# Patient Record
Sex: FEMALE | Race: BLACK OR AFRICAN AMERICAN | NOT HISPANIC OR LATINO | Employment: FULL TIME | URBAN - METROPOLITAN AREA
[De-identification: names, ages, dates, MRNs, and addresses within clinical notes are randomized per-mention and may not be internally consistent; named-entity substitution may affect disease eponyms.]

---

## 2023-03-22 LAB
CLUE CELLS: ABNORMAL
NUGENT SCORE: 9
YEAST: ABNORMAL

## 2023-03-23 LAB
CHLAMYDIA TRACH., AMPLIFIED: NEGATIVE
N. GONORRHEA, AMPLIFIED: NEGATIVE
TRICHOMONAS VAGINALIS: NEGATIVE

## 2023-04-24 ENCOUNTER — APPOINTMENT (OUTPATIENT)
Dept: PRIMARY CARE | Facility: CLINIC | Age: 28
End: 2023-04-24
Payer: COMMERCIAL

## 2023-07-07 ENCOUNTER — TELEPHONE (OUTPATIENT)
Dept: PRIMARY CARE | Facility: CLINIC | Age: 28
End: 2023-07-07
Payer: COMMERCIAL

## 2023-07-10 DIAGNOSIS — Z11.3 ROUTINE SCREENING FOR STI (SEXUALLY TRANSMITTED INFECTION): Primary | ICD-10-CM

## 2023-07-17 ENCOUNTER — LAB (OUTPATIENT)
Dept: LAB | Facility: LAB | Age: 28
End: 2023-07-17
Payer: COMMERCIAL

## 2023-07-17 ENCOUNTER — OFFICE VISIT (OUTPATIENT)
Dept: PRIMARY CARE | Facility: CLINIC | Age: 28
End: 2023-07-17
Payer: COMMERCIAL

## 2023-07-17 VITALS
SYSTOLIC BLOOD PRESSURE: 132 MMHG | HEART RATE: 106 BPM | TEMPERATURE: 97.8 F | DIASTOLIC BLOOD PRESSURE: 92 MMHG | WEIGHT: 93.2 LBS | BODY MASS INDEX: 20.9 KG/M2 | OXYGEN SATURATION: 100 %

## 2023-07-17 DIAGNOSIS — R35.0 FREQUENCY OF URINATION: ICD-10-CM

## 2023-07-17 DIAGNOSIS — Z71.6 ENCOUNTER FOR SMOKING CESSATION COUNSELING: ICD-10-CM

## 2023-07-17 DIAGNOSIS — F32.1 CURRENT MODERATE EPISODE OF MAJOR DEPRESSIVE DISORDER, UNSPECIFIED WHETHER RECURRENT (MULTI): ICD-10-CM

## 2023-07-17 DIAGNOSIS — Z02.89 ENCOUNTER FOR PHYSICAL EXAMINATION RELATED TO EMPLOYMENT: ICD-10-CM

## 2023-07-17 DIAGNOSIS — Z11.3 ROUTINE SCREENING FOR STI (SEXUALLY TRANSMITTED INFECTION): ICD-10-CM

## 2023-07-17 DIAGNOSIS — H44.519 ABSOLUTE GLAUCOMA, UNSPECIFIED LATERALITY: ICD-10-CM

## 2023-07-17 DIAGNOSIS — N30.00 ACUTE CYSTITIS WITHOUT HEMATURIA: ICD-10-CM

## 2023-07-17 DIAGNOSIS — Z00.00 HEALTHCARE MAINTENANCE: Primary | ICD-10-CM

## 2023-07-17 LAB
HIV 1/ 2 AG/AB SCREEN: NONREACTIVE
POC APPEARANCE, URINE: CLEAR
POC BILIRUBIN, URINE: ABNORMAL
POC BLOOD, URINE: NEGATIVE
POC COLOR, URINE: ABNORMAL
POC GLUCOSE, URINE: NEGATIVE MG/DL
POC KETONES, URINE: ABNORMAL MG/DL
POC LEUKOCYTES, URINE: ABNORMAL
POC NITRITE,URINE: POSITIVE
POC PH, URINE: 6 PH
POC PROTEIN, URINE: ABNORMAL MG/DL
POC SPECIFIC GRAVITY, URINE: >=1.03
POC UROBILINOGEN, URINE: 1 EU/DL
SYPHILIS TOTAL AB: NONREACTIVE

## 2023-07-17 PROCEDURE — 87491 CHLMYD TRACH DNA AMP PROBE: CPT

## 2023-07-17 PROCEDURE — 86481 TB AG RESPONSE T-CELL SUSP: CPT

## 2023-07-17 PROCEDURE — 81003 URINALYSIS AUTO W/O SCOPE: CPT

## 2023-07-17 PROCEDURE — 87389 HIV-1 AG W/HIV-1&-2 AB AG IA: CPT

## 2023-07-17 PROCEDURE — 87591 N.GONORRHOEAE DNA AMP PROB: CPT

## 2023-07-17 PROCEDURE — 86780 TREPONEMA PALLIDUM: CPT

## 2023-07-17 PROCEDURE — 36415 COLL VENOUS BLD VENIPUNCTURE: CPT

## 2023-07-17 PROCEDURE — 87661 TRICHOMONAS VAGINALIS AMPLIF: CPT

## 2023-07-17 PROCEDURE — 99395 PREV VISIT EST AGE 18-39: CPT

## 2023-07-17 PROCEDURE — 99406 BEHAV CHNG SMOKING 3-10 MIN: CPT

## 2023-07-17 RX ORDER — BRIMONIDINE TARTRATE AND TIMOLOL MALEATE 2; 5 MG/ML; MG/ML
1 SOLUTION OPHTHALMIC 2 TIMES DAILY
COMMUNITY
Start: 2019-06-07 | End: 2023-07-17 | Stop reason: SDUPTHER

## 2023-07-17 RX ORDER — BRIMONIDINE TARTRATE AND TIMOLOL MALEATE 2; 5 MG/ML; MG/ML
1 SOLUTION OPHTHALMIC 2 TIMES DAILY
Qty: 5 ML | Refills: 3 | Status: SHIPPED | OUTPATIENT
Start: 2023-07-17 | End: 2023-07-17

## 2023-07-17 RX ORDER — IBUPROFEN 200 MG
1 TABLET ORAL EVERY 24 HOURS
Qty: 30 PATCH | Refills: 0 | Status: SHIPPED | OUTPATIENT
Start: 2023-07-17 | End: 2023-07-17

## 2023-07-17 RX ORDER — DORZOLAMIDE HCL 20 MG/ML
1 SOLUTION/ DROPS OPHTHALMIC 2 TIMES DAILY
COMMUNITY
Start: 2022-10-07 | End: 2023-12-07 | Stop reason: WASHOUT

## 2023-07-17 RX ORDER — SULFAMETHOXAZOLE AND TRIMETHOPRIM 800; 160 MG/1; MG/1
1 TABLET ORAL 2 TIMES DAILY
Qty: 6 TABLET | Refills: 0 | Status: SHIPPED | OUTPATIENT
Start: 2023-07-17 | End: 2023-07-17

## 2023-07-17 ASSESSMENT — ENCOUNTER SYMPTOMS
SHORTNESS OF BREATH: 0
ABDOMINAL PAIN: 0
ABDOMINAL DISTENTION: 0
CHEST TIGHTNESS: 0

## 2023-07-17 ASSESSMENT — PAIN SCALES - GENERAL: PAINLEVEL: 0-NO PAIN

## 2023-07-17 NOTE — PROGRESS NOTES
Subjective   Patient ID:   Clinton Camp is a 28 y.o. female who presents for Annual Exam.  HPI  # C/F UTI  - Reports that her urine doesn't smell normal  - Reports she is peeing more frequently than usual  - Denies any dysuria or discharge    #HM  - Diet: feels like she doesn't eat enough, eats only 1 meal a day, has variable appetite  - Exercise: walks a lot    - Social Hx: Reports she drinks a bottle of liquor every other day but is unable to quantify the amount. She smokes 5 black and milds per day and has been smoking for 5 years. She has tried quitting in the past but was not successful. She reports smoking marijuana but denies using any other drugs. She lives in a house with her mom and brother and reports she feels safe    - Sexual Hx: Reports she has been active with 4 male partners in the past 6 months and only uses protection sometimes    PHQ-2 score of 4  PHQ-9 score of 17 - moderate to severe depression with thought of harming herself (+3)  - Denies having any plan  - Reported no access to fire arms at home  - Has never hurt herself in the past and reported that she will seek help before she acts on it  - She reported she is depressed due to stressors in her life such as losing her job and house  - Denies any family history of suicide  - Reports it is difficult to speak with her friends about it  - Interested in speaking with a     Food Security: answered yes to food security questions    #Pre-employment labs  - Needs TB testing for employment    #Med refill  - Requested refills on Combigan eye drops    Review of Systems   Respiratory:  Negative for chest tightness and shortness of breath.    Cardiovascular:  Negative for chest pain.   Gastrointestinal:  Negative for abdominal distention and abdominal pain.       Objective   BP (!) 132/92 (BP Location: Right arm, Patient Position: Sitting, BP Cuff Size: Adult)   Pulse 106   Temp 36.6 °C (97.8 °F) (Temporal)   Wt (!) 42.3 kg (93 lb 3.2 oz)    SpO2 100%   BMI 20.90 kg/m²    Physical Exam  Constitutional:       General: She is not in acute distress.     Appearance: Normal appearance.   Eyes:      Extraocular Movements: Extraocular movements intact.   Cardiovascular:      Rate and Rhythm: Normal rate and regular rhythm.      Heart sounds: Normal heart sounds. No murmur heard.  Pulmonary:      Effort: Pulmonary effort is normal.      Breath sounds: Normal breath sounds.   Abdominal:      General: There is no distension.      Palpations: Abdomen is soft.      Tenderness: There is no abdominal tenderness.   Musculoskeletal:         General: Normal range of motion.   Skin:     General: Skin is warm and dry.   Neurological:      General: No focal deficit present.      Mental Status: She is alert.   Psychiatric:         Mood and Affect: Mood normal.         Behavior: Behavior is withdrawn.         Assessment/Plan     Problem List Items Addressed This Visit    None  Clinton Camp is a 28 year old female who presents to the clinic for a  visit.     #HM  - Ordered STI screen; chlamydia, gonorrhea, syphillis, HIV, & trichomonas  - Patient had recent blood work done at [facility] which was largely unremarkable  - Ordered TB spot test for employment  - Discussed cutting down on alcohol intake and given a alcohol reduction pamphlet    #Depression  - PHQ-9 score of 17 indicating moderate - severe depression  - Referred to psychiatry  - Given handout with information on various mental health councilors  - Patient declined medication at this time and would like to speak with a  first    #UTI  - Ordered IO UA which was positive for Nitrates and LE  - Ordered Bactrim DS BID for 3 days for treatment    #Smoking cessation  - Patient is motivated to cut down on smoking  - Ordered Nicotine patch to help with cravings  - Given smoking cessation hotline pamphlet    #Food security  - Referred to [employer]       #Med refill  - Refilled Combigan eye drops    RTC in 4 weeks  for follow up.    The patient was discussed with Dr. Pichardo.    Demetrio Parish MD  Family Medicine   PGY-2

## 2023-07-18 NOTE — PROGRESS NOTES
"I discussed suicidal ideation personally with the patient. She endorses passive suicidal ideation. In her words, she states: \"Some days I wish I just wouldn't wake up. I've got so much going on. I just lost my house and I'm getting a new place to live.\" She has no access to firearms or other deadly means. She has no plan. She has never been hospitalized for mental health concerns. She has Bahai views againsty suicide. She has a grandmother and an aunt she can confide in, both of whom get some kind of counseling themselves. She offers that if she was ever to feel overwhelmed by her symptoms or concerned about acting on thoughts, she would present to the emergency room.    I saw and evaluated the patient. I personally obtained the key and critical portions of the history and physical exam or was physically present for key and critical portions performed by the resident/fellow. I reviewed the resident/fellow's documentation and discussed the patient with the resident/fellow. I agree with the resident/fellow's medical decision making as documented in the note.    Jabari Pichardo MD      "

## 2023-07-20 LAB
NIL(NEG) CONTROL SPOT COUNT: NORMAL
PANEL A SPOT COUNT: 0
PANEL B SPOT COUNT: 0
POS CONTROL SPOT COUNT: NORMAL
T-SPOT. TB INTERPRETATION: NEGATIVE

## 2023-07-20 NOTE — TELEPHONE ENCOUNTER
Result Communication    Resulted Orders   Syphilis Screen with Reflex   Result Value Ref Range    Syphilis Total Ab NONREACTIVE NONREACTIVE      Comment:      No serologic evidence of syphilis infection.  If recent exposure is suspected, repeat syphilis testing  is recommended in 2 to 4 weeks.   HIV 1/2 Antigen/Antibody Screen with Reflex to Confirmation   Result Value Ref Range    HIV 1 and 2 Screen NONREACTIVE NONREACTIVE      Comment:       HIV Ag/Ab screen is performed using the Siemens Lucid ColloidsllRUNform   HIV Ag/Ab Combo assay which detects the presence of HIV    p24 antigen as well as antibodies to HIV-1   (Group M and O) and HIV-2.  .  No laboratory evidence of HIV infection. If acute HIV infection is   suspected, consider testing for HIV RNA by PCR (viral load).       12:43 PM      Results were successfully communicated with the patient and they acknowledged their understanding.

## 2023-10-10 ENCOUNTER — PHARMACY VISIT (OUTPATIENT)
Dept: PHARMACY | Facility: CLINIC | Age: 28
End: 2023-10-10
Payer: MEDICAID

## 2023-10-10 PROCEDURE — RXMED WILLOW AMBULATORY MEDICATION CHARGE

## 2023-10-18 ENCOUNTER — HOSPITAL ENCOUNTER (EMERGENCY)
Facility: HOSPITAL | Age: 28
Discharge: OTHER INSTITUTION | End: 2023-10-18
Payer: COMMERCIAL

## 2023-10-18 VITALS
RESPIRATION RATE: 16 BRPM | DIASTOLIC BLOOD PRESSURE: 80 MMHG | HEART RATE: 88 BPM | SYSTOLIC BLOOD PRESSURE: 133 MMHG | OXYGEN SATURATION: 98 % | TEMPERATURE: 97.2 F

## 2023-10-18 DIAGNOSIS — R07.81 RIB PAIN ON LEFT SIDE: Primary | ICD-10-CM

## 2023-10-18 PROCEDURE — 99283 EMERGENCY DEPT VISIT LOW MDM: CPT | Performed by: NURSE PRACTITIONER

## 2023-10-18 PROCEDURE — 99281 EMR DPT VST MAYX REQ PHY/QHP: CPT

## 2023-10-18 ASSESSMENT — COLUMBIA-SUICIDE SEVERITY RATING SCALE - C-SSRS
6. HAVE YOU EVER DONE ANYTHING, STARTED TO DO ANYTHING, OR PREPARED TO DO ANYTHING TO END YOUR LIFE?: NO
2. HAVE YOU ACTUALLY HAD ANY THOUGHTS OF KILLING YOURSELF?: NO
1. IN THE PAST MONTH, HAVE YOU WISHED YOU WERE DEAD OR WISHED YOU COULD GO TO SLEEP AND NOT WAKE UP?: NO
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
## 2023-10-25 ENCOUNTER — PHARMACY VISIT (OUTPATIENT)
Dept: PHARMACY | Facility: CLINIC | Age: 28
End: 2023-10-25
Payer: MEDICAID

## 2023-10-25 PROCEDURE — RXMED WILLOW AMBULATORY MEDICATION CHARGE

## 2023-10-29 ENCOUNTER — PHARMACY VISIT (OUTPATIENT)
Dept: PHARMACY | Facility: CLINIC | Age: 28
End: 2023-10-29
Payer: MEDICAID

## 2023-10-29 PROCEDURE — RXMED WILLOW AMBULATORY MEDICATION CHARGE

## 2023-10-30 ENCOUNTER — PHARMACY VISIT (OUTPATIENT)
Dept: PHARMACY | Facility: CLINIC | Age: 28
End: 2023-10-30

## 2023-11-15 ENCOUNTER — PHARMACY VISIT (OUTPATIENT)
Dept: PHARMACY | Facility: CLINIC | Age: 28
End: 2023-11-15
Payer: MEDICAID

## 2023-11-15 DIAGNOSIS — H40.1133 PRIMARY OPEN-ANGLE GLAUCOMA, BILATERAL, SEVERE STAGE: Primary | ICD-10-CM

## 2023-11-15 PROCEDURE — RXMED WILLOW AMBULATORY MEDICATION CHARGE

## 2023-11-15 RX ORDER — DORZOLAMIDE HCL 20 MG/ML
1 SOLUTION/ DROPS OPHTHALMIC 2 TIMES DAILY
Qty: 30 ML | Refills: 6 | Status: SHIPPED | OUTPATIENT
Start: 2023-11-15 | End: 2023-12-07 | Stop reason: WASHOUT

## 2023-11-16 ENCOUNTER — OFFICE VISIT (OUTPATIENT)
Dept: OPHTHALMOLOGY | Facility: CLINIC | Age: 28
End: 2023-11-16
Payer: COMMERCIAL

## 2023-11-16 DIAGNOSIS — H40.9 GLAUCOMA OF BOTH EYES, UNSPECIFIED GLAUCOMA TYPE: Primary | ICD-10-CM

## 2023-11-16 PROCEDURE — 99214 OFFICE O/P EST MOD 30 MIN: CPT | Performed by: OPHTHALMOLOGY

## 2023-11-16 PROCEDURE — 92083 EXTENDED VISUAL FIELD XM: CPT | Performed by: OPHTHALMOLOGY

## 2023-11-16 ASSESSMENT — TONOMETRY
OD_IOP_MMHG: 22
OS_IOP_MMHG: 22
IOP_METHOD: GOLDMANN APPLANATION

## 2023-11-16 ASSESSMENT — VISUAL ACUITY
OD_SC: LP
METHOD: SNELLEN - LINEAR
OS_SC: 20/60

## 2023-11-16 ASSESSMENT — SLIT LAMP EXAM - LIDS
COMMENTS: NORMAL
COMMENTS: NORMAL

## 2023-11-16 ASSESSMENT — EXTERNAL EXAM - LEFT EYE: OS_EXAM: NORMAL

## 2023-11-16 ASSESSMENT — EXTERNAL EXAM - RIGHT EYE: OD_EXAM: NORMAL

## 2023-11-16 NOTE — PROGRESS NOTES
Juvenile glaucoma OU, severe OU : CCT: 584/596. s/p TUBE shunts OU with Dr. Rain (appear to be baerveldts)     patient with long standing hx of non-compliance     of note vision checked by MD today, she maintains LP vision in right eye    [facility] Visual Field - OU - Both Eyes          Left Eye  Threshold was 24-2.     Notes  Severe defect Os, stable from previous             IOP is up OU patient remains non-compliant with drops     re-discussion ~20 minutes with patient re pathophys of glaucoma and permanent blindness potential     discussed alternatives of blindness or more surgery if she doesn`t take drops     she wishes to resume taking drops, instructions for improved compliances given    Continue combigan BID OU     resume latan qhs OU     resume dorzolamide BID ou     rtc 3-4 months for DFE

## 2023-11-27 ENCOUNTER — APPOINTMENT (OUTPATIENT)
Dept: PRIMARY CARE | Facility: CLINIC | Age: 28
End: 2023-11-27
Payer: COMMERCIAL

## 2023-11-30 ENCOUNTER — PHARMACY VISIT (OUTPATIENT)
Dept: PHARMACY | Facility: CLINIC | Age: 28
End: 2023-11-30
Payer: MEDICAID

## 2023-11-30 PROCEDURE — RXMED WILLOW AMBULATORY MEDICATION CHARGE

## 2023-12-07 DIAGNOSIS — H40.9 GLAUCOMA OF BOTH EYES, UNSPECIFIED GLAUCOMA TYPE: Primary | ICD-10-CM

## 2023-12-07 RX ORDER — DORZOLAMIDE HCL 20 MG/ML
1 SOLUTION/ DROPS OPHTHALMIC 2 TIMES DAILY
Qty: 30 ML | Refills: 6 | Status: SHIPPED | OUTPATIENT
Start: 2023-12-07 | End: 2024-12-06

## 2023-12-07 RX ORDER — BRIMONIDINE TARTRATE AND TIMOLOL MALEATE 2; 5 MG/ML; MG/ML
1 SOLUTION OPHTHALMIC 2 TIMES DAILY
Qty: 30 ML | Refills: 6 | Status: SHIPPED | OUTPATIENT
Start: 2023-12-07 | End: 2024-12-06

## 2023-12-07 RX ORDER — LATANOPROST 50 UG/ML
1 SOLUTION/ DROPS OPHTHALMIC
Qty: 7.5 ML | Refills: 3 | Status: SHIPPED | OUTPATIENT
Start: 2023-12-07 | End: 2024-12-06

## 2023-12-08 ENCOUNTER — PHARMACY VISIT (OUTPATIENT)
Dept: PHARMACY | Facility: CLINIC | Age: 28
End: 2023-12-08
Payer: MEDICAID

## 2023-12-08 PROCEDURE — RXMED WILLOW AMBULATORY MEDICATION CHARGE

## 2023-12-11 ENCOUNTER — APPOINTMENT (OUTPATIENT)
Dept: LAB | Facility: LAB | Age: 28
End: 2023-12-11
Payer: COMMERCIAL

## 2023-12-11 ENCOUNTER — OFFICE VISIT (OUTPATIENT)
Dept: OBSTETRICS AND GYNECOLOGY | Facility: CLINIC | Age: 28
End: 2023-12-11
Payer: COMMERCIAL

## 2023-12-11 VITALS
SYSTOLIC BLOOD PRESSURE: 104 MMHG | HEIGHT: 56 IN | BODY MASS INDEX: 22.72 KG/M2 | WEIGHT: 101 LBS | DIASTOLIC BLOOD PRESSURE: 60 MMHG

## 2023-12-11 DIAGNOSIS — Z01.419 WELL WOMAN EXAM WITH ROUTINE GYNECOLOGICAL EXAM: ICD-10-CM

## 2023-12-11 DIAGNOSIS — Z30.09 BIRTH CONTROL COUNSELING: ICD-10-CM

## 2023-12-11 DIAGNOSIS — Z11.3 SCREEN FOR STD (SEXUALLY TRANSMITTED DISEASE): Primary | ICD-10-CM

## 2023-12-11 PROCEDURE — 99385 PREV VISIT NEW AGE 18-39: CPT | Performed by: OBSTETRICS & GYNECOLOGY

## 2023-12-11 PROCEDURE — 87800 DETECT AGNT MULT DNA DIREC: CPT

## 2023-12-11 PROCEDURE — 88175 CYTOPATH C/V AUTO FLUID REDO: CPT

## 2023-12-11 PROCEDURE — 87661 TRICHOMONAS VAGINALIS AMPLIF: CPT

## 2023-12-11 RX ORDER — MEDROXYPROGESTERONE ACETATE 150 MG/ML
150 INJECTION, SUSPENSION INTRAMUSCULAR
Qty: 1 ML | Refills: 3 | Status: SHIPPED | OUTPATIENT
Start: 2023-12-11

## 2023-12-11 RX ORDER — SOFT LENS RINSE,STORE SOLUTION
SOLUTION, NON-ORAL MISCELLANEOUS
COMMUNITY
Start: 2017-08-31

## 2023-12-11 RX ORDER — TIMOLOL MALEATE 5 MG/ML
1 SOLUTION/ DROPS OPHTHALMIC 2 TIMES DAILY
COMMUNITY
Start: 2018-03-01

## 2023-12-11 NOTE — PROGRESS NOTES
"History Of Present Illness  Routine Gyn Exam  Clinton Camp here for routine WWE. Current complaints: desires STD screening.  Sexually active-- three current partners. She has a prior h/o GC and CT and trichomonas.    She reports vaginal discharge that is \"gushing out\" with a foul odor. White in color.     Gynecologic History  No LMP recorded (lmp unknown).  Contraception:  Desires DepoProvera.  She reports her LMP was 2-3 months ago.   Last Depo shot was about a year ago. Last intercourse was one week ago (used a condom).  Last Pap: unsure. Results were: unsure  Last mammogram: NA. Results were: NA  Last Colonoscopy:  NA. Results were: NA  Last DEXA: NA. Results were: NA  Lipid/DM: NA    Obstetric History  OB History    Para Term  AB Living   0 0 0 0 0 0   SAB IAB Ectopic Multiple Live Births   0 0 0 0 0        Past Medical History  She has a past medical history of Acute vaginitis (2019), Encounter for immunization (11/10/2014), Encounter for pre-employment examination (2017), Encounter for surveillance of injectable contraceptive (2019), Ocular pain, right eye (2017), Other chlamydial infection of lower genitourinary tract (2017), Other conditions influencing health status, Overweight, Person with feared health complaint in whom no diagnosis is made (2017), Person with feared health complaint in whom no diagnosis is made (2018), Person with feared health complaint in whom no diagnosis is made (2018), Person with feared health complaint in whom no diagnosis is made (2018), Person with feared health complaint in whom no diagnosis is made (2018), Personal history of diseases of the blood and blood-forming organs and certain disorders involving the immune mechanism (2015), Personal history of other (healed) physical injury and trauma (2016), Personal history of other diseases of the female genital tract (2019), Personal " history of other diseases of the female genital tract (09/23/2021), Personal history of other diseases of the female genital tract (06/19/2018), Personal history of other infectious and parasitic diseases (07/09/2020), Personal history of urinary (tract) infections (07/09/2020), Primary open-angle glaucoma, bilateral, severe stage (03/17/2018), Primary open-angle glaucoma, bilateral, severe stage (03/22/2018), Primary open-angle glaucoma, bilateral, severe stage (02/08/2018), Primary open-angle glaucoma, left eye, severe stage (08/07/2019), Primary open-angle glaucoma, right eye, severe stage (08/07/2019), Primary open-angle glaucoma, unspecified eye, stage unspecified (03/17/2018), Primary open-angle glaucoma, unspecified eye, stage unspecified (03/22/2018), Primary open-angle glaucoma, unspecified eye, stage unspecified (02/08/2018), Unspecified acute and subacute iridocyclitis (07/11/2018), Unspecified episcleritis, right eye (08/07/2019), and Visual discomfort, bilateral (08/07/2019).    Surgical History  She has no past surgical history on file.     Social History  She reports that she has been smoking cigarettes. She has never used smokeless tobacco. She reports current alcohol use. She reports current drug use. Drug: Marijuana.    Family History  No family history on file.     Allergies  Patient has no known allergies.     Physical Exam  Constitutional:       Appearance: Normal appearance.   Pulmonary:      Effort: Pulmonary effort is normal.   Chest:   Breasts:     Right: Normal. No mass, nipple discharge or skin change.      Left: Normal. No mass, nipple discharge or skin change.   Abdominal:      General: Abdomen is flat. There is no distension.      Palpations: Abdomen is soft.      Tenderness: There is no abdominal tenderness.   Genitourinary:     Labia:         Right: No rash, tenderness or lesion.         Left: No rash, tenderness or lesion.       Urethra: No prolapse.      Vagina: Normal. No vaginal  "discharge or bleeding.      Cervix: No friability or lesion.      Uterus: Normal. Not enlarged and not tender.       Adnexa:         Right: No mass, tenderness or fullness.          Left: No mass, tenderness or fullness.     Neurological:      General: No focal deficit present.      Mental Status: She is alert.   Psychiatric:         Mood and Affect: Mood normal.          Last Recorded Vitals  Blood pressure 104/60, height 1.422 m (4' 8\"), weight 45.8 kg (101 lb).    Assessment/Plan   Problem List Items Addressed This Visit    None  Visit Diagnoses       Screen for STD (sexually transmitted disease)    -  Primary    Relevant Orders    C. Trachomatis / N. Gonorrhoeae, Amplified Detection    Hepatitis B Surface Antigen    Hepatitis C Antibody    HIV 1/2 Antigen/Antibody Screen with Reflex to Confirmation    Vaginitis Gram Stain For Bacterial Vaginosis + Yeast    Trichomonas vaginalis, Nucleic Acid Detection    Syphilis Screen with Reflex    Well woman exam with routine gynecological exam        Relevant Orders    THINPREP PAP TEST    Birth control counseling                Contraception: Resume DepoProvera today-- prescription sent to pharmacy for pt to .  We discussed that although her UPT is negative in the office today, there is a very small chance she could have a very early pregnancy since her LMP was 2-3 months ago. We discussed that DepoProvera does not seem to be harmful to an existing pregnancy.  In a process of shared decision making, pt opted to start DepoProvera and will repeat a home UPT in one week.  PAP: obtained   Mammogram: NA   Colonoscopy:  NA   DEXA: NA   Lipid/DM:  NA      Hipolito Gr MD  "

## 2023-12-12 DIAGNOSIS — A59.9 TRICHOMONAS INFECTION: Primary | ICD-10-CM

## 2023-12-12 LAB
C TRACH RRNA SPEC QL NAA+PROBE: NEGATIVE
N GONORRHOEA DNA SPEC QL PROBE+SIG AMP: NEGATIVE
T VAGINALIS RRNA SPEC QL NAA+PROBE: POSITIVE

## 2023-12-12 PROCEDURE — RXMED WILLOW AMBULATORY MEDICATION CHARGE

## 2023-12-12 RX ORDER — METRONIDAZOLE 500 MG/1
500 TABLET ORAL 2 TIMES DAILY
Qty: 14 TABLET | Refills: 0 | Status: SHIPPED | OUTPATIENT
Start: 2023-12-12 | End: 2023-12-20

## 2023-12-13 ENCOUNTER — PHARMACY VISIT (OUTPATIENT)
Dept: PHARMACY | Facility: CLINIC | Age: 28
End: 2023-12-13
Payer: MEDICAID

## 2023-12-27 LAB
CYTOLOGY CMNT CVX/VAG CYTO-IMP: NORMAL
LAB AP HPV GENOTYPE QUESTION: YES
LAB AP HPV HR: NORMAL
LABORATORY COMMENT REPORT: NORMAL
PATH REPORT.TOTAL CANCER: NORMAL

## 2023-12-28 ENCOUNTER — PHARMACY VISIT (OUTPATIENT)
Dept: PHARMACY | Facility: CLINIC | Age: 28
End: 2023-12-28
Payer: MEDICAID

## 2023-12-28 PROCEDURE — RXMED WILLOW AMBULATORY MEDICATION CHARGE

## 2024-01-03 ENCOUNTER — APPOINTMENT (OUTPATIENT)
Dept: OBSTETRICS AND GYNECOLOGY | Facility: HOSPITAL | Age: 29
End: 2024-01-03
Payer: COMMERCIAL

## 2024-01-23 ENCOUNTER — HOSPITAL ENCOUNTER (EMERGENCY)
Facility: HOSPITAL | Age: 29
Discharge: HOME | End: 2024-01-24
Payer: COMMERCIAL

## 2024-01-23 VITALS
RESPIRATION RATE: 18 BRPM | HEART RATE: 99 BPM | SYSTOLIC BLOOD PRESSURE: 121 MMHG | TEMPERATURE: 97.7 F | OXYGEN SATURATION: 100 % | DIASTOLIC BLOOD PRESSURE: 67 MMHG

## 2024-01-23 DIAGNOSIS — N93.8 DYSFUNCTIONAL UTERINE BLEEDING: Primary | ICD-10-CM

## 2024-01-23 PROCEDURE — 99284 EMERGENCY DEPT VISIT MOD MDM: CPT

## 2024-01-23 PROCEDURE — 81025 URINE PREGNANCY TEST: CPT

## 2024-01-23 PROCEDURE — 99284 EMERGENCY DEPT VISIT MOD MDM: CPT | Performed by: NURSE PRACTITIONER

## 2024-01-23 PROCEDURE — 99283 EMERGENCY DEPT VISIT LOW MDM: CPT

## 2024-01-23 ASSESSMENT — LIFESTYLE VARIABLES
REASON UNABLE TO ASSESS: NO
EVER FELT BAD OR GUILTY ABOUT YOUR DRINKING: NO
HAVE YOU EVER FELT YOU SHOULD CUT DOWN ON YOUR DRINKING: NO
HAVE PEOPLE ANNOYED YOU BY CRITICIZING YOUR DRINKING: NO
EVER HAD A DRINK FIRST THING IN THE MORNING TO STEADY YOUR NERVES TO GET RID OF A HANGOVER: NO

## 2024-01-23 ASSESSMENT — COLUMBIA-SUICIDE SEVERITY RATING SCALE - C-SSRS
2. HAVE YOU ACTUALLY HAD ANY THOUGHTS OF KILLING YOURSELF?: NO
1. IN THE PAST MONTH, HAVE YOU WISHED YOU WERE DEAD OR WISHED YOU COULD GO TO SLEEP AND NOT WAKE UP?: NO
6. HAVE YOU EVER DONE ANYTHING, STARTED TO DO ANYTHING, OR PREPARED TO DO ANYTHING TO END YOUR LIFE?: NO

## 2024-01-24 LAB
APPEARANCE UR: ABNORMAL
BACTERIA #/AREA URNS AUTO: ABNORMAL /HPF
BACTERIA UR CULT: NORMAL
BASOPHILS # BLD AUTO: 0.04 X10*3/UL (ref 0–0.1)
BASOPHILS NFR BLD AUTO: 0.7 %
BILIRUB UR STRIP.AUTO-MCNC: ABNORMAL MG/DL
C TRACH RRNA SPEC QL NAA+PROBE: NEGATIVE
COLOR UR: ABNORMAL
EOSINOPHIL # BLD AUTO: 0.25 X10*3/UL (ref 0–0.7)
EOSINOPHIL NFR BLD AUTO: 4.3 %
ERYTHROCYTE [DISTWIDTH] IN BLOOD BY AUTOMATED COUNT: 13 % (ref 11.5–14.5)
GLUCOSE UR STRIP.AUTO-MCNC: NEGATIVE MG/DL
HCT VFR BLD AUTO: 32 % (ref 36–46)
HGB BLD-MCNC: 11.3 G/DL (ref 12–16)
HOLD SPECIMEN: NORMAL
IMM GRANULOCYTES # BLD AUTO: 0.04 X10*3/UL (ref 0–0.7)
IMM GRANULOCYTES NFR BLD AUTO: 0.7 % (ref 0–0.9)
KETONES UR STRIP.AUTO-MCNC: ABNORMAL MG/DL
LEUKOCYTE ESTERASE UR QL STRIP.AUTO: ABNORMAL
LYMPHOCYTES # BLD AUTO: 1.97 X10*3/UL (ref 1.2–4.8)
LYMPHOCYTES NFR BLD AUTO: 34 %
MCH RBC QN AUTO: 32.3 PG (ref 26–34)
MCHC RBC AUTO-ENTMCNC: 35.3 G/DL (ref 32–36)
MCV RBC AUTO: 91 FL (ref 80–100)
MONOCYTES # BLD AUTO: 0.3 X10*3/UL (ref 0.1–1)
MONOCYTES NFR BLD AUTO: 5.2 %
MUCOUS THREADS #/AREA URNS AUTO: ABNORMAL /LPF
N GONORRHOEA DNA SPEC QL PROBE+SIG AMP: NEGATIVE
NEUTROPHILS # BLD AUTO: 3.2 X10*3/UL (ref 1.2–7.7)
NEUTROPHILS NFR BLD AUTO: 55.1 %
NITRITE UR QL STRIP.AUTO: POSITIVE
NRBC BLD-RTO: 0 /100 WBCS (ref 0–0)
PH UR STRIP.AUTO: 5 [PH]
PLATELET # BLD AUTO: 243 X10*3/UL (ref 150–450)
PREGNANCY TEST URINE, POC: NEGATIVE
PROT UR STRIP.AUTO-MCNC: ABNORMAL MG/DL
RBC # BLD AUTO: 3.5 X10*6/UL (ref 4–5.2)
RBC # UR STRIP.AUTO: ABNORMAL /UL
RBC #/AREA URNS AUTO: >20 /HPF
SP GR UR STRIP.AUTO: 1.02
SQUAMOUS #/AREA URNS AUTO: ABNORMAL /HPF
T VAGINALIS RRNA SPEC QL NAA+PROBE: NEGATIVE
UROBILINOGEN UR STRIP.AUTO-MCNC: 2 MG/DL
WBC # BLD AUTO: 5.8 X10*3/UL (ref 4.4–11.3)
WBC #/AREA URNS AUTO: >50 /HPF
WBC CLUMPS #/AREA URNS AUTO: ABNORMAL /HPF

## 2024-01-24 PROCEDURE — 85025 COMPLETE CBC W/AUTO DIFF WBC: CPT | Performed by: NURSE PRACTITIONER

## 2024-01-24 PROCEDURE — 87086 URINE CULTURE/COLONY COUNT: CPT | Performed by: NURSE PRACTITIONER

## 2024-01-24 PROCEDURE — 87800 DETECT AGNT MULT DNA DIREC: CPT | Performed by: NURSE PRACTITIONER

## 2024-01-24 PROCEDURE — 36415 COLL VENOUS BLD VENIPUNCTURE: CPT | Performed by: NURSE PRACTITIONER

## 2024-01-24 PROCEDURE — 81001 URINALYSIS AUTO W/SCOPE: CPT | Performed by: NURSE PRACTITIONER

## 2024-01-24 PROCEDURE — 87661 TRICHOMONAS VAGINALIS AMPLIF: CPT | Mod: 59 | Performed by: NURSE PRACTITIONER

## 2024-01-24 RX ORDER — IBUPROFEN 600 MG/1
600 TABLET ORAL EVERY 6 HOURS PRN
Qty: 15 TABLET | Refills: 0 | Status: SHIPPED | OUTPATIENT
Start: 2024-01-24 | End: 2024-01-31

## 2024-01-24 NOTE — ED PROVIDER NOTES
Emergency Department Encounter  [facility] EMERGENCY MEDICINE    Patient: Clinton Camp  MRN: [id number]  : 1995  Date of Evaluation: 2024  ED Provider: SCOTT Venegas      Chief Complaint       Chief Complaint   Patient presents with    Vaginal Bleeding        Limitations to History: none  Historian: patient  Records reviewed: EMR inpatient and outpatient notes, Care Everywhere    This is a 28-year-old female who presents the emergency room with vaginal bleeding.  Patient states that she has been vaginally bleeding for 3 weeks.  Patient has been using pads daily but is unable to quantify exactly how many pads she is using a day.  Denies any abdominal pain, nausea, vomiting or urinary symptoms.  Patient does not know if she is pregnant.  Does not use any birth control medications.    PMH: Glaucoma  PSH: Denies  Allergies: Denies  Social HX: + smoker, occasional alcohol use, +marijuana use  Family HX: No family history pertinent to current presenting problem  Medications: Reviewed per EMR  ROS:     Review of Systems   Genitourinary:  Positive for vaginal bleeding.     14 systems reviewed and otherwise acutely negative except as in the [place].        Past History     Past Medical History:   Diagnosis Date    Acute vaginitis 2019    Bacterial vaginosis    Encounter for immunization 11/10/2014    Need for immunization against influenza    Encounter for pre-employment examination 2017    Physical exam, pre-employment    Encounter for surveillance of injectable contraceptive 2019    Encounter for Depo-Provera contraception    Ocular pain, right eye 2017    Eye pain, right    Other chlamydial infection of lower genitourinary tract 2017    Chlamydia trachomatis infection of lower genitourinary site    Other conditions influencing health status     Gonococcal Cervicitis    Overweight     Overweight (BMI 25.0-29.9)    Person with feared health complaint in whom  no diagnosis is made 11/21/2017    Concern about STD in female without diagnosis    Person with feared health complaint in whom no diagnosis is made 03/17/2018    Concern about STD in female without diagnosis    Person with feared health complaint in whom no diagnosis is made 03/22/2018    Concern about STD in female without diagnosis    Person with feared health complaint in whom no diagnosis is made 02/08/2018    Concern about STD in female without diagnosis    Person with feared health complaint in whom no diagnosis is made 01/11/2018    Concern about STD in female without diagnosis    Personal history of diseases of the blood and blood-forming organs and certain disorders involving the immune mechanism 08/05/2015    History of anemia    Personal history of other (healed) physical injury and trauma 06/16/2016    History of sprain of wrist    Personal history of other diseases of the female genital tract 02/28/2019    History of vaginitis    Personal history of other diseases of the female genital tract 09/23/2021    History of vaginal discharge    Personal history of other diseases of the female genital tract 06/19/2018    History of abnormal uterine bleeding    Personal history of other infectious and parasitic diseases 07/09/2020    History of sexually transmitted disease    Personal history of urinary (tract) infections 07/09/2020    History of urinary tract infection    Primary open-angle glaucoma, bilateral, severe stage 03/17/2018    Primary open-angle glaucoma, bilateral, severe stage    Primary open-angle glaucoma, bilateral, severe stage 03/22/2018    Primary open-angle glaucoma, bilateral, severe stage    Primary open-angle glaucoma, bilateral, severe stage 02/08/2018    Primary open-angle glaucoma, bilateral, severe stage    Primary open-angle glaucoma, left eye, severe stage 08/07/2019    Primary open angle glaucoma of left eye, severe stage    Primary open-angle glaucoma, right eye, severe stage  08/07/2019    Primary open angle glaucoma of right eye, severe stage    Primary open-angle glaucoma, unspecified eye, stage unspecified 03/17/2018    Primary open-angle glaucoma, unspecified eye, stage unspecified    Primary open-angle glaucoma, unspecified eye, stage unspecified 03/22/2018    Primary open-angle glaucoma, unspecified eye, stage unspecified    Primary open-angle glaucoma, unspecified eye, stage unspecified 02/08/2018    Primary open-angle glaucoma, unspecified eye, stage unspecified    Unspecified acute and subacute iridocyclitis 07/11/2018    Acute iritis of left eye    Unspecified episcleritis, right eye 08/07/2019    Episcleritis of right eye    Visual discomfort, bilateral 08/07/2019    Photophobia of both eyes     History reviewed. No pertinent surgical history.      Medications/Allergies     Previous Medications    BRIMONIDINE-TIMOLOL (COMBIGAN) 0.2-0.5 % OPHTHALMIC SOLUTION    INSTILL 1 DROP IN BOTH EYES TWO TIMES A DAY.    DORZOLAMIDE (TRUSOPT) 2 % OPHTHALMIC SOLUTION    INSTILL 1 DROP INTO BOTH EYES TWO TIMES A DAY    DORZOLAMIDE-TIMOLOL (COSOPT) 22.3-6.8 MG/ML OPHTHALMIC SOLUTION    INSTILL 1 DROP IN BOTH EYES TWO TIMES A DAY    LATANOPROST (XALATAN) 0.005 % OPHTHALMIC SOLUTION    INSTILL 1 DROP IN BOTH EYES AT BEDTIME    MEDROXYPROGESTERONE 150 MG/ML INJECTION    Inject 1 mL (150 mg) into the muscle every 3 months. Please bring to office for injection    ONDANSETRON (ZOFRAN) 4 MG TABLET    TAKE 1 TABLET BY MOUTH EVERY 12 HOURS, AS NEEDED FOR NAUSEA    SOFT LENS RINSE,STORE SOLUTION (SENSITIVE EYES PLUS SALINE) SOLUTION    USE AS DIRECTED.    TIMOLOL (TIMOPTIC) 0.5 % OPHTHALMIC SOLUTION    Administer 1 drop into affected eye(s) twice a day.     No Known Allergies     Physical Exam       ED Triage Vitals [01/23/24 2032]   Temperature Heart Rate Respirations BP   36.5 °C (97.7 °F) 99 18 121/67      Pulse Ox Temp Source Heart Rate Source Patient Position   100 % Temporal Monitor Sitting       BP Location FiO2 (%)     Right arm 21 %       Physical Exam:    Appearance: Alert, oriented , cooperative,  in no acute distress. Well nourished & well hydrated.    Skin: Intact,  dry skin, no lesions, rash, petechiae or purpura.     ENT: Hearing grossly intact. External auditory canals patent, tympanic membranes intact with visible landmarks. Nares patent, mucus membranes moist. Dentition without lesions. Pharynx clear, uvula midline.     Neck: Supple, without meningismus.     Pulmonary: Clear bilaterally with good chest wall excursion. No rales, rhonchi or wheezing. No accessory muscle use or stridor.    Cardiac: Normal S1, S2 without murmur, rub, gallop or extrasystole. No JVD, Carotids without bruits.    Abdomen: Soft, nontender, active bowel sounds.  No palpable organomegaly.  No rebound or guarding.      Genitourinary: Declined a pelvic exam.    Musculoskeletal: Full range of motion. no pain, edema, or deformity. Pulses full and equal. No cyanosis, clubbing, or edema.    Neurological:  Normal sensation, no weakness, no focal findings identified.    Psychiatric: Appropriate mood and affect.       Diagnostics   Labs:  Results for orders placed or performed during the hospital encounter of 01/23/24 (from the past 24 hour(s))   CBC and Auto Differential   Result Value Ref Range    WBC 5.8 4.4 - 11.3 x10*3/uL    nRBC 0.0 0.0 - 0.0 /100 WBCs    RBC 3.50 (L) 4.00 - 5.20 x10*6/uL    Hemoglobin 11.3 (L) 12.0 - 16.0 g/dL    Hematocrit 32.0 (L) 36.0 - 46.0 %    MCV 91 80 - 100 fL    MCH 32.3 26.0 - 34.0 pg    MCHC 35.3 32.0 - 36.0 g/dL    RDW 13.0 11.5 - 14.5 %    Platelets 243 150 - 450 x10*3/uL    Neutrophils % 55.1 40.0 - 80.0 %    Immature Granulocytes %, Automated 0.7 0.0 - 0.9 %    Lymphocytes % 34.0 13.0 - 44.0 %    Monocytes % 5.2 2.0 - 10.0 %    Eosinophils % 4.3 0.0 - 6.0 %    Basophils % 0.7 0.0 - 2.0 %    Neutrophils Absolute 3.20 1.20 - 7.70 x10*3/uL    Immature Granulocytes Absolute, Automated 0.04 0.00 -  0.70 x10*3/uL    Lymphocytes Absolute 1.97 1.20 - 4.80 x10*3/uL    Monocytes Absolute 0.30 0.10 - 1.00 x10*3/uL    Eosinophils Absolute 0.25 0.00 - 0.70 x10*3/uL    Basophils Absolute 0.04 0.00 - 0.10 x10*3/uL   POCT pregnancy, urine   Result Value Ref Range    Preg Test, Ur Negative Negative      Radiographs:  No orders to display             Assessment   In brief, Clinton Camp is a 28 y.o. female who presented to the emergency department with vaginal bleeding.          ED Course/MDM   Visit Vitals  /67 (BP Location: Right arm, Patient Position: Sitting)   Pulse 99   Temp 36.5 °C (97.7 °F) (Temporal)   Resp 18   SpO2 100%   OB Status Having periods   Smoking Status Every Day       Medications - No data to display    Patient remained stable while in the emergency department. Previous outpatient and ED records were reviewed.  Discussed differentials with the patient including dysfunctional uterine bleeding, pregnancy, miscarriage, ectopic pregnancy. Outside records were reviewed.  Urine pregnancy test was negative.  CBC was obtained hemoglobin was 11.3 and hematocrit was 32.0.  Patient declined a pelvic exam while in the emergency room.  Vital signs were stable.  Patient was discharged home, advised to follow-up with OB/GYN and return the emergency room with worsening symptoms.    Final Impression    Dysfunctional uterine bleeding    DISPOSITION  Disposition: Discharged home    Comment: Please note this report has been produced using speech recognition software and may contain errors related to that system including errors in grammar, punctuation, and spelling, as well as words and phrases that may be inappropriate.  If there are any questions or concerns please feel free to contact the dictating provider for clarification.    BRITNEY Venegas-SCOTT Del Valle  01/24/24 0142

## 2024-01-31 ENCOUNTER — APPOINTMENT (OUTPATIENT)
Dept: OBSTETRICS AND GYNECOLOGY | Facility: HOSPITAL | Age: 29
End: 2024-01-31
Payer: COMMERCIAL

## 2024-02-10 PROCEDURE — RXMED WILLOW AMBULATORY MEDICATION CHARGE

## 2024-02-15 ENCOUNTER — PHARMACY VISIT (OUTPATIENT)
Dept: PHARMACY | Facility: CLINIC | Age: 29
End: 2024-02-15
Payer: MEDICAID

## 2024-03-21 ENCOUNTER — APPOINTMENT (OUTPATIENT)
Dept: OPHTHALMOLOGY | Facility: CLINIC | Age: 29
End: 2024-03-21
Payer: COMMERCIAL

## 2024-04-16 PROCEDURE — RXMED WILLOW AMBULATORY MEDICATION CHARGE

## 2024-04-18 ENCOUNTER — PHARMACY VISIT (OUTPATIENT)
Dept: PHARMACY | Facility: CLINIC | Age: 29
End: 2024-04-18
Payer: MEDICAID

## 2024-05-16 PROCEDURE — RXMED WILLOW AMBULATORY MEDICATION CHARGE

## 2024-05-17 ENCOUNTER — HOSPITAL ENCOUNTER (EMERGENCY)
Facility: HOSPITAL | Age: 29
Discharge: HOME | End: 2024-05-17
Attending: EMERGENCY MEDICINE
Payer: COMMERCIAL

## 2024-05-17 VITALS
HEART RATE: 87 BPM | TEMPERATURE: 97.7 F | SYSTOLIC BLOOD PRESSURE: 120 MMHG | OXYGEN SATURATION: 100 % | DIASTOLIC BLOOD PRESSURE: 77 MMHG | WEIGHT: 110 LBS | BODY MASS INDEX: 24.75 KG/M2 | HEIGHT: 56 IN | RESPIRATION RATE: 16 BRPM

## 2024-05-17 DIAGNOSIS — Z20.2 POSSIBLE EXPOSURE TO STI: Primary | ICD-10-CM

## 2024-05-17 LAB
CLUE CELLS SPEC QL WET PREP: NORMAL
PREGNANCY TEST URINE, POC: NEGATIVE
T VAGINALIS SPEC QL WET PREP: NORMAL
WBC VAG QL WET PREP: NORMAL
YEAST VAG QL WET PREP: NORMAL

## 2024-05-17 PROCEDURE — 2500000004 HC RX 250 GENERAL PHARMACY W/ HCPCS (ALT 636 FOR OP/ED): Mod: SE

## 2024-05-17 PROCEDURE — 99283 EMERGENCY DEPT VISIT LOW MDM: CPT

## 2024-05-17 PROCEDURE — 87205 SMEAR GRAM STAIN: CPT

## 2024-05-17 PROCEDURE — 87210 SMEAR WET MOUNT SALINE/INK: CPT

## 2024-05-17 PROCEDURE — 2500000001 HC RX 250 WO HCPCS SELF ADMINISTERED DRUGS (ALT 637 FOR MEDICARE OP): Mod: SE

## 2024-05-17 PROCEDURE — 87491 CHLMYD TRACH DNA AMP PROBE: CPT

## 2024-05-17 PROCEDURE — 96372 THER/PROPH/DIAG INJ SC/IM: CPT

## 2024-05-17 PROCEDURE — 81025 URINE PREGNANCY TEST: CPT

## 2024-05-17 PROCEDURE — 99284 EMERGENCY DEPT VISIT MOD MDM: CPT | Performed by: EMERGENCY MEDICINE

## 2024-05-17 RX ORDER — DOXYCYCLINE HYCLATE 100 MG
100 TABLET ORAL ONCE
Status: COMPLETED | OUTPATIENT
Start: 2024-05-17 | End: 2024-05-17

## 2024-05-17 RX ORDER — CEFTRIAXONE 500 MG/1
500 INJECTION, POWDER, FOR SOLUTION INTRAMUSCULAR; INTRAVENOUS ONCE
Status: COMPLETED | OUTPATIENT
Start: 2024-05-17 | End: 2024-05-17

## 2024-05-17 RX ORDER — DOXYCYCLINE 100 MG/1
100 CAPSULE ORAL 2 TIMES DAILY
Qty: 20 CAPSULE | Refills: 0 | Status: SHIPPED | OUTPATIENT
Start: 2024-05-17 | End: 2024-05-20 | Stop reason: ALTCHOICE

## 2024-05-17 RX ADMIN — DOXYCYCLINE HYCLATE 100 MG: 100 TABLET, COATED ORAL at 19:58

## 2024-05-17 RX ADMIN — CEFTRIAXONE SODIUM 500 MG: 500 INJECTION, POWDER, FOR SOLUTION INTRAMUSCULAR; INTRAVENOUS at 19:58

## 2024-05-17 ASSESSMENT — COLUMBIA-SUICIDE SEVERITY RATING SCALE - C-SSRS
1. IN THE PAST MONTH, HAVE YOU WISHED YOU WERE DEAD OR WISHED YOU COULD GO TO SLEEP AND NOT WAKE UP?: NO
6. HAVE YOU EVER DONE ANYTHING, STARTED TO DO ANYTHING, OR PREPARED TO DO ANYTHING TO END YOUR LIFE?: NO
2. HAVE YOU ACTUALLY HAD ANY THOUGHTS OF KILLING YOURSELF?: NO

## 2024-05-17 NOTE — ED TRIAGE NOTES
Pt presents to ED for possible pregnancy. Pt states she would like a pregnancy test. Pt states she has been experiencing n/v x2 weeks. Pt endorses blood spotting. Pt state she would like an STD test. Pt endorses vaginal itching.

## 2024-05-17 NOTE — DISCHARGE INSTRUCTIONS
You were seen in the emergency department today for a pregnancy test and STI testing. Your pregnancy test was negative, and you STI tests are still in process. You were given treatment for STIs and a prescription for 10 days of doxycycline was sent to your pharmacy. Please use this medication as instructed by your pharmacist. You should follow-up with your primary care provider if you have any concerns.  Please return to the emergency department if you begin experiencing any fevers/chills, abdominal pain, nausea/vomiting, or lightheadedness.
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
## 2024-05-18 LAB
C TRACH RRNA SPEC QL NAA+PROBE: NEGATIVE
CLUE CELLS VAG LPF-#/AREA: ABNORMAL /[LPF]
N GONORRHOEA DNA SPEC QL PROBE+SIG AMP: NEGATIVE
NUGENT SCORE: 7
YEAST VAG WET PREP-#/AREA: ABNORMAL

## 2024-05-20 ENCOUNTER — TELEPHONE (OUTPATIENT)
Dept: PHARMACY | Facility: HOSPITAL | Age: 29
End: 2024-05-20
Payer: COMMERCIAL

## 2024-05-20 DIAGNOSIS — N76.0 BACTERIAL VULVOVAGINITIS: Primary | ICD-10-CM

## 2024-05-20 DIAGNOSIS — B96.89 BACTERIAL VULVOVAGINITIS: Primary | ICD-10-CM

## 2024-05-20 PROCEDURE — RXMED WILLOW AMBULATORY MEDICATION CHARGE

## 2024-05-20 RX ORDER — METRONIDAZOLE 500 MG/1
500 TABLET ORAL 2 TIMES DAILY
Qty: 14 TABLET | Refills: 0 | Status: SHIPPED | OUTPATIENT
Start: 2024-05-20 | End: 2024-05-31

## 2024-05-20 NOTE — PROGRESS NOTES
EDPD Note: Initiation     Contacted Clinton Camp regarding a positive BV culture/result that was taken during their recent emergency room visit. I completed education with patient . The patient is not being treated appropriately. She presented to ED for STI & pregnancy testing. Chlamydia and Gonorrhea negative - MAY STOP Doxycycline. Pregnancy test negative. (+) BV, needs treatment for this. The following prescription was sent to the patient's preferred pharmacy. No further follow up needed from EDPD Team.     Drug: Metronidazole 500mg tablets  Sig: Take 1 tablet by mouth twice a day for 7 days  Qty: 14  Days Supply: 7    If there are any other questions for the ED Post-Discharge Culture Follow Up Team, please contact [phone]. Fax: [phone].    Kaylah Chairez, PharmD

## 2024-05-22 ENCOUNTER — APPOINTMENT (OUTPATIENT)
Dept: OPHTHALMOLOGY | Facility: CLINIC | Age: 29
End: 2024-05-22
Payer: COMMERCIAL

## 2024-05-24 ENCOUNTER — PHARMACY VISIT (OUTPATIENT)
Dept: PHARMACY | Facility: CLINIC | Age: 29
End: 2024-05-24
Payer: MEDICAID

## 2024-06-11 PROCEDURE — RXMED WILLOW AMBULATORY MEDICATION CHARGE

## 2024-06-12 ENCOUNTER — PHARMACY VISIT (OUTPATIENT)
Dept: PHARMACY | Facility: CLINIC | Age: 29
End: 2024-06-12
Payer: MEDICAID

## 2024-07-10 ENCOUNTER — APPOINTMENT (OUTPATIENT)
Dept: OBSTETRICS AND GYNECOLOGY | Facility: CLINIC | Age: 29
End: 2024-07-10
Payer: COMMERCIAL

## 2024-08-19 ENCOUNTER — APPOINTMENT (OUTPATIENT)
Dept: OPHTHALMOLOGY | Facility: CLINIC | Age: 29
End: 2024-08-19
Payer: COMMERCIAL

## 2024-08-22 ENCOUNTER — PHARMACY VISIT (OUTPATIENT)
Dept: PHARMACY | Facility: CLINIC | Age: 29
End: 2024-08-22
Payer: MEDICAID

## 2024-08-22 PROCEDURE — RXMED WILLOW AMBULATORY MEDICATION CHARGE

## 2024-09-03 ENCOUNTER — APPOINTMENT (OUTPATIENT)
Dept: OPHTHALMOLOGY | Facility: CLINIC | Age: 29
End: 2024-09-03
Payer: COMMERCIAL

## 2024-10-07 PROCEDURE — RXMED WILLOW AMBULATORY MEDICATION CHARGE

## 2024-10-17 ENCOUNTER — PHARMACY VISIT (OUTPATIENT)
Dept: PHARMACY | Facility: CLINIC | Age: 29
End: 2024-10-17
Payer: MEDICAID

## 2024-10-19 ENCOUNTER — HOSPITAL ENCOUNTER (EMERGENCY)
Facility: HOSPITAL | Age: 29
Discharge: HOME | End: 2024-10-19
Payer: COMMERCIAL

## 2024-10-19 VITALS
OXYGEN SATURATION: 96 % | HEART RATE: 79 BPM | HEIGHT: 59 IN | TEMPERATURE: 96.9 F | DIASTOLIC BLOOD PRESSURE: 68 MMHG | WEIGHT: 120 LBS | RESPIRATION RATE: 18 BRPM | SYSTOLIC BLOOD PRESSURE: 110 MMHG | BODY MASS INDEX: 24.19 KG/M2

## 2024-10-19 PROCEDURE — 4500999001 HC ED NO CHARGE

## 2024-10-19 ASSESSMENT — PAIN SCALES - GENERAL: PAINLEVEL_OUTOF10: 6

## 2024-10-19 ASSESSMENT — PAIN DESCRIPTION - LOCATION: LOCATION: FACE

## 2024-10-19 ASSESSMENT — PAIN - FUNCTIONAL ASSESSMENT: PAIN_FUNCTIONAL_ASSESSMENT: 0-10
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
## 2024-11-14 ENCOUNTER — APPOINTMENT (OUTPATIENT)
Dept: DERMATOLOGY | Facility: CLINIC | Age: 29
End: 2024-11-14
Payer: COMMERCIAL

## 2024-11-22 ENCOUNTER — OFFICE VISIT (OUTPATIENT)
Dept: OBSTETRICS AND GYNECOLOGY | Facility: CLINIC | Age: 29
End: 2024-11-22
Payer: COMMERCIAL

## 2024-11-22 VITALS
HEART RATE: 97 BPM | SYSTOLIC BLOOD PRESSURE: 115 MMHG | WEIGHT: 122.7 LBS | BODY MASS INDEX: 24.78 KG/M2 | DIASTOLIC BLOOD PRESSURE: 82 MMHG

## 2024-11-22 DIAGNOSIS — Z11.3 SCREENING EXAMINATION FOR STI: Primary | ICD-10-CM

## 2024-11-22 DIAGNOSIS — N93.0 PCB (POST COITAL BLEEDING): ICD-10-CM

## 2024-11-22 DIAGNOSIS — Z30.09 GENERAL COUNSELING AND ADVICE ON FEMALE CONTRACEPTION: ICD-10-CM

## 2024-11-22 LAB — PREGNANCY TEST URINE, POC: NEGATIVE

## 2024-11-22 PROCEDURE — 4004F PT TOBACCO SCREEN RCVD TLK: CPT | Performed by: STUDENT IN AN ORGANIZED HEALTH CARE EDUCATION/TRAINING PROGRAM

## 2024-11-22 PROCEDURE — 81025 URINE PREGNANCY TEST: CPT | Performed by: STUDENT IN AN ORGANIZED HEALTH CARE EDUCATION/TRAINING PROGRAM

## 2024-11-22 PROCEDURE — 99213 OFFICE O/P EST LOW 20 MIN: CPT | Performed by: STUDENT IN AN ORGANIZED HEALTH CARE EDUCATION/TRAINING PROGRAM

## 2024-11-22 NOTE — PROGRESS NOTES
Subjective   Patient ID: Clinton Camp is a 29 y.o. female who presents for bleeding during intercourse, STI screening. Unsure LMP.    Has noticed bleeding during intercourse for 2 weeks, also with wiping. Spotting, no clots. Currently being treated for BV from dx at urgent care, taking flagyl mostly as prescribed but some difficulty with regularity. Was tested for STIs at Urgent care, but has not heard results. On search of Care Everywhere, notes reviewed and can see order but no results. Patient plans to call for results today, declines testing here.    Objective   Physical Exam  Vitals reviewed. Exam conducted with a chaperone present.   Constitutional:       Appearance: Normal appearance.   HENT:      Head: Normocephalic.   Cardiovascular:      Rate and Rhythm: Normal rate and regular rhythm.   Pulmonary:      Effort: Pulmonary effort is normal. No respiratory distress.   Abdominal:      General: There is no distension.      Palpations: Abdomen is soft. There is no mass.      Tenderness: There is no abdominal tenderness. There is no guarding or rebound.   Genitourinary:     Comments: Normal appearing external female genitalia. Vulva without lesions. Vaginal mucosa normal appearing with small amt of menstrual blood and no lesions. Cervix normal appearing without lesions.     Skin:     General: Skin is warm and dry.   Neurological:      General: No focal deficit present.      Mental Status: She is alert.   Psychiatric:         Mood and Affect: Mood normal.         Behavior: Behavior normal.         Thought Content: Thought content normal.         Judgment: Judgment normal.         Assessment/Plan   Problem List Items Addressed This Visit             ICD-10-CM    PCB (post coital bleeding) N93.0     UPT today negative  Exam with evidence of menstruation - otherwise benign  Currently with partially treated BV  Explained that bleeding is likely either irregular period or irritation from BV, no evidence of lesions or  other problems  Pt reassured; encouraged to call urgent care for lab results         General counseling and advice on female contraception Z30.09     No current contraception  Desires Depo  UPT today negative - offered patient initiation of depo today, she declines and wishes to come back for depo appointment  RTC when ready          Other Visit Diagnoses         Codes    Screening examination for STI    -  Primary Z11.3    Relevant Orders    Trichomonas vaginalis, Amplified    POCT pregnancy, urine manually resulted (Completed)                 Tobin Ramirez MD 11/22/24 1:50 PM

## 2024-11-22 NOTE — ASSESSMENT & PLAN NOTE
UPT today negative  Exam with evidence of menstruation - otherwise benign  Currently with partially treated BV  Explained that bleeding is likely either irregular period or irritation from BV, no evidence of lesions or other problems  Pt reassured; encouraged to call urgent care for lab results

## 2024-11-22 NOTE — ASSESSMENT & PLAN NOTE
No current contraception  Desires Depo  UPT today negative - offered patient initiation of depo today, she declines and wishes to come back for depo appointment  RTC when ready

## 2024-12-03 PROCEDURE — RXMED WILLOW AMBULATORY MEDICATION CHARGE

## 2024-12-03 NOTE — PROGRESS NOTES
12/4/2024 CC: 29 y.o. presents for glaucoma FU, Dr Wheeler    Past ocular history: POAG***  Family history: no family history of glaucoma ***  Past medical history: As per chart   Social history: denies tobacco ***    Eye medications:   Both eyes: Latanoprost qhs, Combigan bid, Dorzolamide bid***    Allergy:  As per chart     Testing:    HVF 24-2 12/4/2024: ***    OCT 12/4/2024: ***    Pachymetry 12/4/2024: ***    Gonioscopy 12/4/2024: ***    Tmax:    Assessment:    Juvenile glaucoma OU, severe OU : CCT: 584/596. s/p TUBE shunts OU with Dr. Rain (appear to be baerveldts)   patient with long standing hx of non-compliance   of note vision checked by MD today, she maintains LP vision in right eye   IOP is up OU patient remains non-compliant with drops   re-discussion ~20 minutes with patient re pathophys of glaucoma and permanent blindness potential   discussed alternatives of blindness or more surgery if she doesn`t take drops   she wishes to resume taking drops, instructions for improved compliances given  Continue combigan BID OU    resume latan qhs OU   resume dorzolamide BID ou   rtc 3-4 months for DFE  12/4/2024:***    Plan:   I explained my findings. ***    Eye medications:   Both eyes: ***    Return in ***

## 2024-12-04 ENCOUNTER — APPOINTMENT (OUTPATIENT)
Dept: OPHTHALMOLOGY | Facility: CLINIC | Age: 29
End: 2024-12-04
Payer: COMMERCIAL

## 2024-12-06 ENCOUNTER — PHARMACY VISIT (OUTPATIENT)
Dept: PHARMACY | Facility: CLINIC | Age: 29
End: 2024-12-06
Payer: MEDICAID

## 2024-12-19 ENCOUNTER — APPOINTMENT (OUTPATIENT)
Dept: OPHTHALMOLOGY | Facility: CLINIC | Age: 29
End: 2024-12-19
Payer: COMMERCIAL

## 2024-12-23 DIAGNOSIS — H40.9 GLAUCOMA OF BOTH EYES, UNSPECIFIED GLAUCOMA TYPE: ICD-10-CM

## 2024-12-23 ASSESSMENT — SLIT LAMP EXAM - LIDS
COMMENTS: GOOD POSITION
COMMENTS: GOOD POSITION

## 2024-12-23 ASSESSMENT — EXTERNAL EXAM - LEFT EYE: OS_EXAM: NORMAL

## 2024-12-23 ASSESSMENT — CUP TO DISC RATIO
OS_RATIO: .8
OD_RATIO: .99

## 2024-12-23 ASSESSMENT — EXTERNAL EXAM - RIGHT EYE: OD_EXAM: NORMAL

## 2024-12-30 ENCOUNTER — APPOINTMENT (OUTPATIENT)
Dept: OPHTHALMOLOGY | Facility: CLINIC | Age: 29
End: 2024-12-30
Payer: COMMERCIAL

## 2024-12-30 DIAGNOSIS — Q15.0 OPEN-ANGLE GLAUCOMA OF CHILDHOOD: Primary | ICD-10-CM

## 2025-01-02 RX ORDER — BRIMONIDINE TARTRATE AND TIMOLOL MALEATE 2; 5 MG/ML; MG/ML
1 SOLUTION OPHTHALMIC 2 TIMES DAILY
Qty: 30 ML | Refills: 6 | Status: SHIPPED | OUTPATIENT
Start: 2025-01-02 | End: 2026-01-02

## 2025-01-06 ENCOUNTER — APPOINTMENT (OUTPATIENT)
Dept: OPHTHALMOLOGY | Age: 30
End: 2025-01-06
Payer: COMMERCIAL

## 2025-01-08 NOTE — PROGRESS NOTES
1/10/2025  CC: 29 y.o. presents for glaucoma FU, Dr Wheeler    Past ocular history: JOAG, GDI OU  Family history: no family history of glaucoma ***  Past medical history: As per chart   Social history: denies tobacco ***    Eye medications:   Both eyes: Latanoprost qhs, Dorzolamide bid,  Combigan bid***    Allergy:  As per chart     Testing:    none    Assessment:   - Juvenile glaucoma OU, severe OU : CCT: 584/596. s/p TUBE shunts OU with Dr. Rain (appear to be baerveldts)      patient with long standing hx of non-compliance   of note vision checked by MD today, she maintains LP vision in right eye   IOP is up OU patient remains non-compliant with drops   re-discussion ~20 minutes with patient re pathophys of glaucoma and permanent blindness potential   discussed alternatives of blindness or more surgery if she doesn`t take drops   she wishes to resume taking drops, instructions for improved compliances given   Continue combigan BID OU   resume latan qhs OU   resume dorzolamide BID ou   rtc 3-4 months for DFE  1/10/2025:***    Plan:   I explained my findings. ***    Eye medications:   Both eyes: ***    Return in ***

## 2025-01-10 ENCOUNTER — OFFICE VISIT (OUTPATIENT)
Dept: OPHTHALMOLOGY | Facility: CLINIC | Age: 30
End: 2025-01-10
Payer: COMMERCIAL

## 2025-01-10 ENCOUNTER — APPOINTMENT (OUTPATIENT)
Dept: OPHTHALMOLOGY | Facility: CLINIC | Age: 30
End: 2025-01-10
Payer: COMMERCIAL

## 2025-01-10 DIAGNOSIS — Q15.0 JUVENILE GLAUCOMA: Primary | ICD-10-CM

## 2025-01-10 PROCEDURE — 99214 OFFICE O/P EST MOD 30 MIN: CPT | Performed by: OPHTHALMOLOGY

## 2025-01-10 PROCEDURE — RXMED WILLOW AMBULATORY MEDICATION CHARGE

## 2025-01-10 PROCEDURE — 92083 EXTENDED VISUAL FIELD XM: CPT | Mod: LEFT SIDE | Performed by: OPHTHALMOLOGY

## 2025-01-10 RX ORDER — BRIMONIDINE TARTRATE 2 MG/ML
1 SOLUTION/ DROPS OPHTHALMIC 2 TIMES DAILY
Qty: 15 ML | Refills: 12 | Status: SHIPPED | OUTPATIENT
Start: 2025-01-10 | End: 2026-01-10

## 2025-01-10 RX ORDER — DORZOLAMIDE HYDROCHLORIDE AND TIMOLOL MALEATE 20; 5 MG/ML; MG/ML
1 SOLUTION/ DROPS OPHTHALMIC 2 TIMES DAILY
Qty: 10 ML | Refills: 12 | Status: SHIPPED | OUTPATIENT
Start: 2025-01-10 | End: 2026-01-10

## 2025-01-10 RX ORDER — LATANOPROST 50 UG/ML
1 SOLUTION/ DROPS OPHTHALMIC NIGHTLY
Qty: 2.5 ML | Refills: 12 | Status: SHIPPED | OUTPATIENT
Start: 2025-01-10 | End: 2026-01-10

## 2025-01-10 ASSESSMENT — CONF VISUAL FIELD
OS_SUPERIOR_NASAL_RESTRICTION: 0
OD_INFERIOR_NASAL_RESTRICTION: 0
OD_SUPERIOR_TEMPORAL_RESTRICTION: 0
OD_INFERIOR_TEMPORAL_RESTRICTION: 0
OS_INFERIOR_NASAL_RESTRICTION: 0
OS_SUPERIOR_TEMPORAL_RESTRICTION: 0
OD_SUPERIOR_NASAL_RESTRICTION: 0
OS_NORMAL: 1
OS_INFERIOR_TEMPORAL_RESTRICTION: 0
OD_NORMAL: 1

## 2025-01-10 ASSESSMENT — TONOMETRY
OS_IOP_MMHG: 19
IOP_METHOD: GOLDMANN APPLANATION
OD_IOP_MMHG: 23

## 2025-01-10 ASSESSMENT — VISUAL ACUITY
METHOD: SNELLEN - LINEAR
OS_SC: 20/100
OD_SC: LP

## 2025-01-10 ASSESSMENT — ENCOUNTER SYMPTOMS
MUSCULOSKELETAL NEGATIVE: 0
EYES NEGATIVE: 1
CARDIOVASCULAR NEGATIVE: 0
PSYCHIATRIC NEGATIVE: 0
RESPIRATORY NEGATIVE: 0
HEMATOLOGIC/LYMPHATIC NEGATIVE: 0
CONSTITUTIONAL NEGATIVE: 0
ALLERGIC/IMMUNOLOGIC NEGATIVE: 0
ENDOCRINE NEGATIVE: 0
NEUROLOGICAL NEGATIVE: 0
GASTROINTESTINAL NEGATIVE: 0

## 2025-01-10 ASSESSMENT — CUP TO DISC RATIO
OD_RATIO: .99
OS_RATIO: .8

## 2025-01-10 ASSESSMENT — SLIT LAMP EXAM - LIDS
COMMENTS: GOOD POSITION
COMMENTS: GOOD POSITION

## 2025-01-10 ASSESSMENT — EXTERNAL EXAM - LEFT EYE: OS_EXAM: NORMAL

## 2025-01-10 ASSESSMENT — EXTERNAL EXAM - RIGHT EYE: OD_EXAM: NORMAL

## 2025-01-10 ASSESSMENT — PACHYMETRY
OS_CT(UM): 596
OD_CT(UM): 584

## 2025-01-10 NOTE — PROGRESS NOTES
1/10/2025  CC: 29 y.o. presents for glaucoma FU, Dr Wheeler    Past ocular history: JOAG, GDI OU  Family history: no family history of glaucoma   Past medical history: As per chart   Social history: smoker    Eye medications:   Both eyes:  Combigan bid (ran out 2 weeks ago)    Allergy:  As per chart     Testing:    HVF 24-2 1/10/2025: OS-FL, GD,  IAD>SAD approaching fixation, MD -26.8 dB    Assessment:   - Juvenile glaucoma OU, advanced OD, severe OS : CCT: 584/596. s/p TUBE shunts OU with Dr. Rain (appear to be baerveldts)      patient with long standing hx of non-compliance   of note vision checked by MD today, she maintains LP vision in right eye   IOP is up OU patient remains non-compliant with drops   re-discussion ~20 minutes with patient re pathophys of glaucoma and permanent blindness potential   discussed alternatives of blindness or more surgery if she doesn`t take drops   she wishes to resume taking drops, instructions for improved compliances given   Continue combigan BID OU   resume latan qhs OU   resume dorzolamide BID ou   rtc 3-4 months for DFE  1/10/2025: IOP 23/19 (above target OU). VF - possible progression, confounded by cataract. Non-compliant with eye drops/follow up. Only uses Combigan bid (ran out 2 weeks ago). Will restart Latanoprost qhs and start Cosopt bid, brimonidine bid.  Advised to keep 5 minutes between drops/ close the eye for 2 min after instillation/ emphasized the importance of compliance. Explained glaucoma is forever, cannot reverse MICHELLE already sustained, patient can still go on to undergo vision loss form it if progresses in future. Our goals are to slow down MICHELLE damage, but cannot FIX it. May require additional glaucoma procedures to stabilize glaucoma in future.      Plan:   I explained my findings. Juvenile Glaucoma     Eye medications:   Both eyes: Restart Latanoprost qhs, start Cosopt bid, Brimonidine bid    Return in 1 mo, IOP check, Dr Wheeler

## 2025-01-16 ENCOUNTER — PHARMACY VISIT (OUTPATIENT)
Dept: PHARMACY | Facility: CLINIC | Age: 30
End: 2025-01-16
Payer: MEDICAID

## 2025-01-18 ENCOUNTER — HOSPITAL ENCOUNTER (EMERGENCY)
Facility: HOSPITAL | Age: 30
Discharge: HOME | End: 2025-01-19
Attending: STUDENT IN AN ORGANIZED HEALTH CARE EDUCATION/TRAINING PROGRAM
Payer: COMMERCIAL

## 2025-01-18 ENCOUNTER — APPOINTMENT (OUTPATIENT)
Dept: RADIOLOGY | Facility: HOSPITAL | Age: 30
End: 2025-01-18
Payer: COMMERCIAL

## 2025-01-18 VITALS
TEMPERATURE: 98.4 F | RESPIRATION RATE: 17 BRPM | OXYGEN SATURATION: 97 % | DIASTOLIC BLOOD PRESSURE: 88 MMHG | HEART RATE: 92 BPM | SYSTOLIC BLOOD PRESSURE: 133 MMHG

## 2025-01-18 DIAGNOSIS — R11.2 NAUSEA AND VOMITING, UNSPECIFIED VOMITING TYPE: Primary | ICD-10-CM

## 2025-01-18 LAB
ALBUMIN SERPL BCP-MCNC: 4.5 G/DL (ref 3.4–5)
ALP SERPL-CCNC: 78 U/L (ref 33–110)
ALT SERPL W P-5'-P-CCNC: 12 U/L (ref 7–45)
ANION GAP SERPL CALC-SCNC: 16 MMOL/L (ref 10–20)
APPEARANCE UR: ABNORMAL
AST SERPL W P-5'-P-CCNC: 20 U/L (ref 9–39)
BASOPHILS # BLD AUTO: 0.02 X10*3/UL (ref 0–0.1)
BASOPHILS NFR BLD AUTO: 0.2 %
BILIRUB SERPL-MCNC: 0.7 MG/DL (ref 0–1.2)
BILIRUB UR STRIP.AUTO-MCNC: NEGATIVE MG/DL
BUN SERPL-MCNC: 10 MG/DL (ref 6–23)
CALCIUM SERPL-MCNC: 9.8 MG/DL (ref 8.6–10.6)
CHLORIDE SERPL-SCNC: 99 MMOL/L (ref 98–107)
CO2 SERPL-SCNC: 25 MMOL/L (ref 21–32)
COLOR UR: YELLOW
CREAT SERPL-MCNC: 0.6 MG/DL (ref 0.5–1.05)
EGFRCR SERPLBLD CKD-EPI 2021: >90 ML/MIN/1.73M*2
EOSINOPHIL # BLD AUTO: 0.02 X10*3/UL (ref 0–0.7)
EOSINOPHIL NFR BLD AUTO: 0.2 %
ERYTHROCYTE [DISTWIDTH] IN BLOOD BY AUTOMATED COUNT: 17.3 % (ref 11.5–14.5)
FLUAV RNA RESP QL NAA+PROBE: NOT DETECTED
FLUBV RNA RESP QL NAA+PROBE: NOT DETECTED
GLUCOSE SERPL-MCNC: 93 MG/DL (ref 74–99)
GLUCOSE UR STRIP.AUTO-MCNC: NORMAL MG/DL
HCT VFR BLD AUTO: 29.7 % (ref 36–46)
HGB BLD-MCNC: 9.3 G/DL (ref 12–16)
HYALINE CASTS #/AREA URNS AUTO: ABNORMAL /LPF
HYPOCHROMIA BLD QL SMEAR: NORMAL
IMM GRANULOCYTES # BLD AUTO: 0.03 X10*3/UL (ref 0–0.7)
IMM GRANULOCYTES NFR BLD AUTO: 0.3 % (ref 0–0.9)
KETONES UR STRIP.AUTO-MCNC: ABNORMAL MG/DL
LEUKOCYTE ESTERASE UR QL STRIP.AUTO: NEGATIVE
LIPASE SERPL-CCNC: 103 U/L (ref 9–82)
LYMPHOCYTES # BLD AUTO: 1.21 X10*3/UL (ref 1.2–4.8)
LYMPHOCYTES NFR BLD AUTO: 13 %
MCH RBC QN AUTO: 23.7 PG (ref 26–34)
MCHC RBC AUTO-ENTMCNC: 31.3 G/DL (ref 32–36)
MCV RBC AUTO: 76 FL (ref 80–100)
MONOCYTES # BLD AUTO: 0.78 X10*3/UL (ref 0.1–1)
MONOCYTES NFR BLD AUTO: 8.4 %
MUCOUS THREADS #/AREA URNS AUTO: ABNORMAL /LPF
NEUTROPHILS # BLD AUTO: 7.22 X10*3/UL (ref 1.2–7.7)
NEUTROPHILS NFR BLD AUTO: 77.9 %
NITRITE UR QL STRIP.AUTO: NEGATIVE
NRBC BLD-RTO: 0 /100 WBCS (ref 0–0)
PH UR STRIP.AUTO: 6 [PH]
PLATELET # BLD AUTO: 189 X10*3/UL (ref 150–450)
POTASSIUM SERPL-SCNC: 3.8 MMOL/L (ref 3.5–5.3)
PREGNANCY TEST URINE, POC: NEGATIVE
PROT SERPL-MCNC: 8.2 G/DL (ref 6.4–8.2)
PROT UR STRIP.AUTO-MCNC: ABNORMAL MG/DL
RBC # BLD AUTO: 3.92 X10*6/UL (ref 4–5.2)
RBC # UR STRIP.AUTO: NEGATIVE /UL
RBC #/AREA URNS AUTO: ABNORMAL /HPF
RBC MORPH BLD: NORMAL
SARS-COV-2 RNA RESP QL NAA+PROBE: NOT DETECTED
SODIUM SERPL-SCNC: 136 MMOL/L (ref 136–145)
SP GR UR STRIP.AUTO: 1.03
SQUAMOUS #/AREA URNS AUTO: ABNORMAL /HPF
STOMATOCYTES BLD QL SMEAR: NORMAL
UROBILINOGEN UR STRIP.AUTO-MCNC: ABNORMAL MG/DL
WBC # BLD AUTO: 9.3 X10*3/UL (ref 4.4–11.3)
WBC #/AREA URNS AUTO: ABNORMAL /HPF

## 2025-01-18 PROCEDURE — 99284 EMERGENCY DEPT VISIT MOD MDM: CPT | Performed by: PHYSICIAN ASSISTANT

## 2025-01-18 PROCEDURE — 99284 EMERGENCY DEPT VISIT MOD MDM: CPT | Performed by: STUDENT IN AN ORGANIZED HEALTH CARE EDUCATION/TRAINING PROGRAM

## 2025-01-18 PROCEDURE — 96361 HYDRATE IV INFUSION ADD-ON: CPT

## 2025-01-18 PROCEDURE — 96374 THER/PROPH/DIAG INJ IV PUSH: CPT

## 2025-01-18 PROCEDURE — 83690 ASSAY OF LIPASE: CPT | Performed by: EMERGENCY MEDICINE

## 2025-01-18 PROCEDURE — 81001 URINALYSIS AUTO W/SCOPE: CPT | Performed by: EMERGENCY MEDICINE

## 2025-01-18 PROCEDURE — 81025 URINE PREGNANCY TEST: CPT | Performed by: PHYSICIAN ASSISTANT

## 2025-01-18 PROCEDURE — 36415 COLL VENOUS BLD VENIPUNCTURE: CPT | Performed by: EMERGENCY MEDICINE

## 2025-01-18 PROCEDURE — 76856 US EXAM PELVIC COMPLETE: CPT

## 2025-01-18 PROCEDURE — 80053 COMPREHEN METABOLIC PANEL: CPT | Performed by: EMERGENCY MEDICINE

## 2025-01-18 PROCEDURE — 2500000004 HC RX 250 GENERAL PHARMACY W/ HCPCS (ALT 636 FOR OP/ED): Mod: SE | Performed by: PHYSICIAN ASSISTANT

## 2025-01-18 PROCEDURE — 87636 SARSCOV2 & INF A&B AMP PRB: CPT | Performed by: EMERGENCY MEDICINE

## 2025-01-18 PROCEDURE — 87636 SARSCOV2 & INF A&B AMP PRB: CPT | Performed by: STUDENT IN AN ORGANIZED HEALTH CARE EDUCATION/TRAINING PROGRAM

## 2025-01-18 PROCEDURE — 85025 COMPLETE CBC W/AUTO DIFF WBC: CPT | Performed by: EMERGENCY MEDICINE

## 2025-01-18 PROCEDURE — 76856 US EXAM PELVIC COMPLETE: CPT | Performed by: STUDENT IN AN ORGANIZED HEALTH CARE EDUCATION/TRAINING PROGRAM

## 2025-01-18 RX ORDER — ONDANSETRON HYDROCHLORIDE 2 MG/ML
4 INJECTION, SOLUTION INTRAVENOUS ONCE
Status: COMPLETED | OUTPATIENT
Start: 2025-01-18 | End: 2025-01-18

## 2025-01-18 RX ADMIN — SODIUM CHLORIDE, POTASSIUM CHLORIDE, SODIUM LACTATE AND CALCIUM CHLORIDE 1000 ML: 600; 310; 30; 20 INJECTION, SOLUTION INTRAVENOUS at 18:33

## 2025-01-18 RX ADMIN — ONDANSETRON 4 MG: 2 INJECTION INTRAMUSCULAR; INTRAVENOUS at 19:46

## 2025-01-18 ASSESSMENT — PAIN SCALES - GENERAL: PAINLEVEL_OUTOF10: 5 - MODERATE PAIN

## 2025-01-18 ASSESSMENT — PAIN - FUNCTIONAL ASSESSMENT: PAIN_FUNCTIONAL_ASSESSMENT: 0-10

## 2025-01-18 ASSESSMENT — COLUMBIA-SUICIDE SEVERITY RATING SCALE - C-SSRS
1. IN THE PAST MONTH, HAVE YOU WISHED YOU WERE DEAD OR WISHED YOU COULD GO TO SLEEP AND NOT WAKE UP?: NO
2. HAVE YOU ACTUALLY HAD ANY THOUGHTS OF KILLING YOURSELF?: NO
6. HAVE YOU EVER DONE ANYTHING, STARTED TO DO ANYTHING, OR PREPARED TO DO ANYTHING TO END YOUR LIFE?: NO

## 2025-01-18 NOTE — ED TRIAGE NOTES
Patient states she is having N/V. Seen at [facility] and was diagnosed with cyst in stomach. Patient here for second evaluation.

## 2025-01-18 NOTE — Clinical Note
Clinton Camp was seen and treated in our emergency department on 1/18/2025.  She may return to work on 01/20/2025.       If you have any questions or concerns, please don't hesitate to call.      Angella Martins, DO

## 2025-01-18 NOTE — ED PROVIDER NOTES
Emergency Department Encounter  [facility] EMERGENCY MEDICINE    Patient: Clinton Camp  MRN: [id number]  : 1995  Date of Evaluation: 2025  ED Provider: Kiara Rutherford PA-C      Chief Complaint       Chief Complaint   Patient presents with    Vomiting     [place]    (Location/Symptom, Timing/Onset, Context/Setting, Quality, Duration, Modifying Factors, Severity) Note limiting factors.   Limitations to History: None  Historian: Patient  Records reviewed: EMR inpatient and outpatient notes, Care Everywhere      Clinton Camp is a 29 y.o. female who presents to the emergency department reporting n/v and abd pain that began last night and has resolved this morning. She has a hx of this happening in the past and was seen at [facility] on 24 for the same. She had a CT scan of the abdomen at that time that was equivocal for torsion. She had a follow up pelvic US that was negative for torsion but did show a R ovarian cyst. She has a follow up with gynecology on 25 for this issue. She was feeling well between that visit at [facility] and last night but then began having generalized abd pain associated with nausea and vomiting. She reports that she vomited 5 or 6 times last night. She was concerned that it might be the cyst causing her issues. She feels better now and reports that the pain is gone and she isn't nauseous but instead hungry. She has had chills but denies fever, CP, SOB, diarrhea or blood in the stool. She denies any other concerning symptoms.  Of note, she has a hx of being diagnosed with pancreatitis 2 years ago with a lipase of 128 at [facility]. She does admit to drinking ETOH (approx 32 oz of wine sometimes daily), using MJ, and tobacco. She has never been hospitalized for pancreatitis in the past.      ROS:     Review of Systems  14 systems reviewed and otherwise acutely negative except as in the [place].      Past History     Past Medical History:   Diagnosis Date    Acute vaginitis  03/01/2019    Bacterial vaginosis    Encounter for immunization 11/10/2014    Need for immunization against influenza    Encounter for pre-employment examination 02/24/2017    Physical exam, pre-employment    Encounter for surveillance of injectable contraceptive 11/04/2019    Encounter for Depo-Provera contraception    Ocular pain, right eye 08/31/2017    Eye pain, right    Other chlamydial infection of lower genitourinary tract 09/03/2017    Chlamydia trachomatis infection of lower genitourinary site    Other conditions influencing health status     Gonococcal Cervicitis    Overweight     Overweight (BMI 25.0-29.9)    Person with feared health complaint in whom no diagnosis is made 11/21/2017    Concern about STD in female without diagnosis    Person with feared health complaint in whom no diagnosis is made 03/17/2018    Concern about STD in female without diagnosis    Person with feared health complaint in whom no diagnosis is made 03/22/2018    Concern about STD in female without diagnosis    Person with feared health complaint in whom no diagnosis is made 02/08/2018    Concern about STD in female without diagnosis    Person with feared health complaint in whom no diagnosis is made 01/11/2018    Concern about STD in female without diagnosis    Personal history of diseases of the blood and blood-forming organs and certain disorders involving the immune mechanism 08/05/2015    History of anemia    Personal history of other (healed) physical injury and trauma 06/16/2016    History of sprain of wrist    Personal history of other diseases of the female genital tract 02/28/2019    History of vaginitis    Personal history of other diseases of the female genital tract 09/23/2021    History of vaginal discharge    Personal history of other diseases of the female genital tract 06/19/2018    History of abnormal uterine bleeding    Personal history of other infectious and parasitic diseases 07/09/2020    History of sexually  transmitted disease    Personal history of urinary (tract) infections 07/09/2020    History of urinary tract infection    Primary open-angle glaucoma, bilateral, severe stage 03/17/2018    Primary open-angle glaucoma, bilateral, severe stage    Primary open-angle glaucoma, bilateral, severe stage 03/22/2018    Primary open-angle glaucoma, bilateral, severe stage    Primary open-angle glaucoma, bilateral, severe stage 02/08/2018    Primary open-angle glaucoma, bilateral, severe stage    Primary open-angle glaucoma, left eye, severe stage 08/07/2019    Primary open angle glaucoma of left eye, severe stage    Primary open-angle glaucoma, right eye, severe stage 08/07/2019    Primary open angle glaucoma of right eye, severe stage    Primary open-angle glaucoma, unspecified eye, stage unspecified 03/17/2018    Primary open-angle glaucoma, unspecified eye, stage unspecified    Primary open-angle glaucoma, unspecified eye, stage unspecified 03/22/2018    Primary open-angle glaucoma, unspecified eye, stage unspecified    Primary open-angle glaucoma, unspecified eye, stage unspecified 02/08/2018    Primary open-angle glaucoma, unspecified eye, stage unspecified    Unspecified acute and subacute iridocyclitis 07/11/2018    Acute iritis of left eye    Unspecified episcleritis, right eye 08/07/2019    Episcleritis of right eye    Visual discomfort, bilateral 08/07/2019    Photophobia of both eyes     No past surgical history on file.  Social History     Socioeconomic History    Marital status: Single   Tobacco Use    Smoking status: Every Day     Types: Cigarettes    Smokeless tobacco: Never   Substance and Sexual Activity    Alcohol use: Yes    Drug use: Yes     Types: Marijuana   Drinks 16 oz wine sometimes nightly per patient    Medications/Allergies     Previous Medications    BRIMONIDINE (ALPHAGAN) 0.2 % OPHTHALMIC SOLUTION    Administer 1 drop into both eyes 2 times a day.    BRIMONIDINE-TIMOLOL (COMBIGAN) 0.2-0.5 %  OPHTHALMIC SOLUTION    INSTILL 1 DROP IN BOTH EYES TWO TIMES A DAY.    DORZOLAMIDE-TIMOLOL (COSOPT) 22.3-6.8 MG/ML OPHTHALMIC SOLUTION    INSTILL 1 DROP IN BOTH EYES TWO TIMES A DAY    DORZOLAMIDE-TIMOLOL (COSOPT) 22.3-6.8 MG/ML OPHTHALMIC SOLUTION    Administer 1 drop into both eyes 2 times a day.    LATANOPROST (XALATAN) 0.005 % OPHTHALMIC SOLUTION    Administer 1 drop into both eyes once daily at bedtime.    MEDROXYPROGESTERONE 150 MG/ML INJECTION    Inject 1 mL (150 mg) into the muscle every 3 months. Please bring to office for injection    SOFT LENS RINSE,STORE SOLUTION (SENSITIVE EYES PLUS SALINE) SOLUTION    USE AS DIRECTED.    TIMOLOL (TIMOPTIC) 0.5 % OPHTHALMIC SOLUTION    Administer 1 drop into affected eye(s) twice a day.     No Known Allergies     Physical Exam       ED Triage Vitals [01/18/25 1521]   Temperature Heart Rate Respirations BP   36.9 °C (98.4 °F) (!) 102 18 128/65      Pulse Ox Temp Source Heart Rate Source Patient Position   100 % Oral Monitor --      BP Location FiO2 (%)     -- --     Vitals were repeated due to the tachycardia.     Physical Exam    GENERAL:  The patient appears nourished and normally developed. Vital signs as documented.     HEENT:  Head normocephalic, atraumatic, EOMs intact, Mucous membranes moist. Nares patent without copious rhinorrhea.  No lymphadenopathy.    PULMONARY:  Lungs are clear to auscultation, without any respiratory distress. Able to speak full sentences, no accessory muscle use    CARDIAC:   Normal rate. No murmurs, rubs or gallops    ABDOMEN:  Soft, non distended,  BS positive x 4 quadrants, Mild tenderness in the epigastric area and the LLQ. No rebound or guarding, no peritoneal signs, no CVA tenderness, no masses or organomegaly    MUSCULOSKELETAL:   Able to ambulate, Non edematous, with no obvious deformities. Pulses intact distal    SKIN:   Good color, with no significant rashes.  No pallor.    NEURO:  No obvious neurological deficits, normal  sensation and strength bilaterally.  Able to follow commands, NIH 0, CN 2-12 intact.        Diagnostics   Labs:  Labs Reviewed   CBC WITH AUTO DIFFERENTIAL - Abnormal       Result Value    WBC 9.3      nRBC 0.0      RBC 3.92 (*)     Hemoglobin 9.3 (*)     Hematocrit 29.7 (*)     MCV 76 (*)     MCH 23.7 (*)     MCHC 31.3 (*)     RDW 17.3 (*)     Platelets 189      Neutrophils % 77.9      Immature Granulocytes %, Automated 0.3      Lymphocytes % 13.0      Monocytes % 8.4      Eosinophils % 0.2      Basophils % 0.2      Neutrophils Absolute 7.22      Immature Granulocytes Absolute, Automated 0.03      Lymphocytes Absolute 1.21      Monocytes Absolute 0.78      Eosinophils Absolute 0.02      Basophils Absolute 0.02     LIPASE - Abnormal    Lipase 103 (*)     Narrative:     Venipuncture immediately after or during the administration of Metamizole may lead to falsely low results. Testing should be performed immediately prior to Metamizole dosing.   COMPREHENSIVE METABOLIC PANEL - Normal    Glucose 93      Sodium 136      Potassium 3.8      Chloride 99      Bicarbonate 25      Anion Gap 16      Urea Nitrogen 10      Creatinine 0.60      eGFR >90      Calcium 9.8      Albumin 4.5      Alkaline Phosphatase 78      Total Protein 8.2      AST 20      Bilirubin, Total 0.7      ALT 12     SARS-COV-2 AND INFLUENZA A/B PCR - Normal    Flu A Result Not Detected      Flu B Result Not Detected      Coronavirus 2019, PCR Not Detected      Narrative:     This assay has received FDA Emergency Use Authorization (EUA) and  is only authorized for the duration of time that circumstances exist to justify the authorization of the emergency use of in vitro diagnostic tests for the detection of SARS-CoV-2 virus and/or diagnosis of COVID-19 infection under section 564(b)(1) of the Act, 21 U.S.C. 360bbb-3(b)(1). Testing for SARS-CoV-2 is only recommended for patients who meet current clinical and/or epidemiological criteria as defined by federal,  state, or local public health directives. This assay is an in vitro diagnostic nucleic acid amplification test for the qualitative detection of SARS-CoV-2, Influenza A, and Influenza B from nasopharyngeal specimens and has been validated for use at [facility]. Negative results do not preclude COVID-19 infections or Influenza A/B infections, and should not be used as the sole basis for diagnosis, treatment, or other management decisions. If Influenza A/B and RSV PCR results are negative, testing for Parainfluenza virus, Adenovirus and Metapneumovirus is routinely performed for [facility] pediatric oncology and intensive care inpatients, and is available on other patients by placing an add-on request.    POCT PREGNANCY, URINE - Normal    Preg Test, Ur Negative     URINALYSIS WITH REFLEX CULTURE AND MICROSCOPIC    Narrative:     The following orders were created for panel order Urinalysis with Reflex Culture and Microscopic.  Procedure                               Abnormality         Status                     ---------                               -----------         ------                     Urinalysis with Reflex C...[[id number]]                                                 Extra Urine Gray Tube[[id number]]                                                         Please view results for these tests on the individual orders.   URINALYSIS WITH REFLEX CULTURE AND MICROSCOPIC   EXTRA URINE GRAY TUBE   MORPHOLOGY    RBC Morphology See Below      Hypochromia Marked      Stomatocytes Few       Radiographs:  No orders to display       ED Course   Visit Vitals  /88   Pulse 92   Temp 36.9 °C (98.4 °F) (Oral)   Resp 17   SpO2 97%   OB Status Having periods   Smoking Status Every Day     ED Course as of 01/18/25 2237   Sat Jan 18, 2025 1925 Heart Rate: 92  HR improved from previous of 102 bpm.  [JS]   1925 Urinalysis with Reflex Culture and Microscopic(!)  No infection noted.  [JS]   1925 LIPASE(!):  103  Noted and discussed with patient.  [JS]   1926 HEMOGLOBIN(!): 9.3  Compared to previous and slightly decreased. MCV of 76 and hypochromic, most likely YEIMI.  [JS]   1926 Pt provided with crackers and clear fluids.  [JS]   1940 Pt feeling nauseous. Given Zofran.  [JS]      ED Course User Index  [JS] Kiara GRANADOS Sample, PARAG     Medications   lactated Ringer's bolus 1,000 mL (1,000 mL intravenous New Bag 1/18/25 1833)       Differentials   Pancreatitis  Ovarian torsion  Gastroenteritis  GB disease  STI  Pregnancy      Assessment/MDM   In brief, Clinton Camp is a 29 y.o. female who presented to the emergency department with abd pain, n/v. She had tenderness on exam. Given zofran for nausea and was able to tolerate PO food and fluids. Lipase elevated but not twice normal making pancreatitis unlikely. Labs show anemia that has been present before. Pt has follow up with Gyn in place. Will likely need follow up with GI as well. Pt signed out to oncoming team with US pending. Dispo will depend on US results.       Comment: Please note this report has been produced using speech recognition software and may contain errors related to that system including errors in grammar, punctuation, and spelling, as well as words and phrases that may be inappropriate.  If there are any questions or concerns please feel free to contact the dictating provider for clarification.    Kiara Rutherford, PARAG Rutherford, PARAG  01/18/25 9079

## 2025-01-19 LAB — HOLD SPECIMEN: NORMAL

## 2025-01-19 NOTE — DISCHARGE INSTRUCTIONS
You are seen for evaluation of abdominal pain nausea and vomiting.  You did have a slight elevation in your lipase which can show signs of stress on your pancreas.  Please avoid drinking alcohol or eating significant fatty foods as this could make this worse.  Your ultrasound does not show a large cyst on your ovary then that could be leading to a torsion or twisting but it could cause some of the pain you are experiencing.  Please follow-up with your OB/GYN as well as primary care regarding your visit today.  You can use Tylenol and ibuprofen as needed for pain control

## 2025-01-19 NOTE — PROGRESS NOTES
Emergency Medicine Transition of Care Note.    I received Clinton Camp in signout from Previous ED care provider.  Please see the previous ED provider note for all HPI, PE and MDM up to the time of signout at 2200. This is in addition to the primary record.    In brief Clinton Camp is an 29 y.o. female presenting for   Chief Complaint   Patient presents with    Vomiting     At the time of signout we were awaiting: Final result of ultrasound    Medical Decision Making  29 y.o. female who presents to the emergency department reporting n/v and abd pain that began last night and has resolved this morning.     Signed out pending US and final dispo.     On reevaluation patient is feeling well able to eat walks with steady gait in no significant distress.  Discussed ultrasound results showing small cyst no signs of torsion.  Discussed that this could be a cause of pain especially with rupture and follow-up with OB/GYN.  Patient was also encouraged to avoid alcohol and return if she is having any worsening abdominal pain.  Patient agreeable plan for discharge and all questions answered.        Please see ED course for updates on patient status, results, and disposition.     ED Course as of 01/19/25 0052   Sat Jan 18, 2025 1925 Heart Rate: 92  HR improved from previous of 102 bpm.  [JS]   1925 Urinalysis with Reflex Culture and Microscopic(!)  No infection noted.  [JS]   1925 LIPASE(!): 103  Noted and discussed with patient.  [JS]   1926 HEMOGLOBIN(!): 9.3  Compared to previous and slightly decreased. MCV of 76 and hypochromic, most likely YEIMI.  [JS]   1926 Pt provided with crackers and clear fluids.  [JS]   1940 Pt feeling nauseous. Given Zofran.  [JS]      ED Course User Index  [JS] Kiara Rutherford PA-C         Diagnoses as of 01/19/25 0052   Nausea and vomiting, unspecified vomiting type           Final diagnoses:   [R11.2] Nausea and vomiting, unspecified vomiting type           Procedure  Procedures    Patient  seen and discussed with MD Angella Lund, DO  PGY-3 Emergency Medicine

## 2025-01-24 ENCOUNTER — APPOINTMENT (OUTPATIENT)
Dept: PRIMARY CARE | Facility: CLINIC | Age: 30
End: 2025-01-24
Payer: COMMERCIAL

## 2025-02-12 NOTE — PROGRESS NOTES
2/14/2025 CC: 29 y.o. presents for 1 mo glaucoma FU w/ IOP check, Dr Wheeler    Past ocular history: JOAG, GDI OU  Family history: no family history of glaucoma   Past medical history: As per chart   Social history: smoker    Eye medications:   Both eyes:  Latanoprost qhs, Cosopt bid and Brimonidine bid    Allergy:  As per chart     Testing:    HVF 24-2 1/10/2025: OS-FL, GD,  IAD>SAD approaching fixation, MD -26.8 dB    Pachymetry: 584/596    Assessment:   - Juvenile glaucoma OU, advanced OD, severe OS : CCT: 584/596. s/p TUBE shunts OU with Dr. Rain (appear to be baerveldts)      patient with long standing hx of non-compliance   of note vision checked by MD today, she maintains LP vision in right eye   IOP is up OU patient remains non-compliant with drops   re-discussion ~20 minutes with patient re pathophys of glaucoma and permanent blindness potential   discussed alternatives of blindness or more surgery if she doesn`t take drops   she wishes to resume taking drops, instructions for improved compliances given   Continue combigan BID OU   resume latan qhs OU   resume dorzolamide BID ou   rtc 3-4 months for DFE  1/10/2025: IOP 23/19 (above target OU). VF - possible progression, confounded by cataract. Non-compliant with eye drops/follow up. Only uses Combigan bid (ran out 2 weeks ago). Will restart Latanoprost qhs and start Cosopt bid, brimonidine bid.  Advised to keep 5 minutes between drops/ close the eye for 2 min after instillation/ emphasized the importance of compliance. Explained glaucoma is forever, cannot reverse MICHELLE already sustained, patient can still go on to undergo vision loss form it if progresses in future. Our goals are to slow down MICHELLE damage, but cannot FIX it. May require additional glaucoma procedures to stabilize glaucoma in future.  2/14/2025: IOP 14/15. Improved with compliance of gtt. Possible cataract progression OS.      Plan:   I explained my findings. Juvenile Glaucoma     Eye  medications:   Both eyes: Continue Latanoprost qhs, Cosopt bid, Brimonidine bid    Return in 2-3 mo, IOP check, Dr Wheeler

## 2025-02-14 ENCOUNTER — APPOINTMENT (OUTPATIENT)
Dept: OPHTHALMOLOGY | Facility: CLINIC | Age: 30
End: 2025-02-14
Payer: COMMERCIAL

## 2025-02-14 DIAGNOSIS — Q15.0 JUVENILE GLAUCOMA: Primary | ICD-10-CM

## 2025-02-14 PROCEDURE — 99213 OFFICE O/P EST LOW 20 MIN: CPT | Performed by: OPHTHALMOLOGY

## 2025-02-14 ASSESSMENT — CONF VISUAL FIELD
OS_INFERIOR_TEMPORAL_RESTRICTION: 1
OD_INFERIOR_TEMPORAL_RESTRICTION: 1
OD_INFERIOR_NASAL_RESTRICTION: 1
OD_SUPERIOR_TEMPORAL_RESTRICTION: 1
OD_SUPERIOR_NASAL_RESTRICTION: 1
OS_SUPERIOR_TEMPORAL_RESTRICTION: 3
OS_INFERIOR_NASAL_RESTRICTION: 1
COMMENTS: DIFFICULT TO ASSESS
OS_SUPERIOR_NASAL_RESTRICTION: 3

## 2025-02-14 ASSESSMENT — ENCOUNTER SYMPTOMS: EYES NEGATIVE: 1

## 2025-02-14 ASSESSMENT — VISUAL ACUITY
OS_SC: 20/250
METHOD: SNELLEN - LINEAR

## 2025-02-14 ASSESSMENT — SLIT LAMP EXAM - LIDS
COMMENTS: GOOD POSITION
COMMENTS: GOOD POSITION

## 2025-02-14 ASSESSMENT — PACHYMETRY
OS_CT(UM): 596
OD_CT(UM): 584

## 2025-02-14 ASSESSMENT — EXTERNAL EXAM - LEFT EYE: OS_EXAM: NORMAL

## 2025-02-14 ASSESSMENT — TONOMETRY
OS_IOP_MMHG: 15
IOP_METHOD: GOLDMANN APPLANATION
OD_IOP_MMHG: 14

## 2025-02-14 ASSESSMENT — EXTERNAL EXAM - RIGHT EYE: OD_EXAM: NORMAL

## 2025-02-14 ASSESSMENT — CUP TO DISC RATIO
OD_RATIO: .99
OS_RATIO: .8

## 2025-03-12 PROCEDURE — RXMED WILLOW AMBULATORY MEDICATION CHARGE

## 2025-03-14 ENCOUNTER — PHARMACY VISIT (OUTPATIENT)
Dept: PHARMACY | Facility: CLINIC | Age: 30
End: 2025-03-14
Payer: MEDICAID

## 2025-03-16 ENCOUNTER — HOSPITAL ENCOUNTER (EMERGENCY)
Facility: HOSPITAL | Age: 30
Discharge: HOME | End: 2025-03-16
Payer: COMMERCIAL

## 2025-03-16 VITALS
TEMPERATURE: 99 F | HEART RATE: 92 BPM | DIASTOLIC BLOOD PRESSURE: 76 MMHG | SYSTOLIC BLOOD PRESSURE: 111 MMHG | RESPIRATION RATE: 16 BRPM | OXYGEN SATURATION: 98 %

## 2025-03-16 DIAGNOSIS — R68.89 FLU-LIKE SYMPTOMS: Primary | ICD-10-CM

## 2025-03-16 LAB
FLUAV RNA RESP QL NAA+PROBE: NOT DETECTED
FLUBV RNA RESP QL NAA+PROBE: NOT DETECTED
RSV RNA RESP QL NAA+PROBE: NOT DETECTED
SARS-COV-2 RNA RESP QL NAA+PROBE: NOT DETECTED

## 2025-03-16 PROCEDURE — 99284 EMERGENCY DEPT VISIT MOD MDM: CPT

## 2025-03-16 PROCEDURE — 99283 EMERGENCY DEPT VISIT LOW MDM: CPT

## 2025-03-16 PROCEDURE — 87637 SARSCOV2&INF A&B&RSV AMP PRB: CPT

## 2025-03-16 RX ORDER — FLUTICASONE PROPIONATE 50 MCG
2 SPRAY, SUSPENSION (ML) NASAL DAILY
Qty: 16 G | Refills: 0 | Status: SHIPPED | OUTPATIENT
Start: 2025-03-16 | End: 2025-04-17

## 2025-03-16 RX ORDER — CETIRIZINE HYDROCHLORIDE 10 MG/1
10 TABLET ORAL DAILY
Qty: 30 TABLET | Refills: 0 | Status: SHIPPED | OUTPATIENT
Start: 2025-03-16 | End: 2025-04-17

## 2025-03-16 RX ORDER — ACETAMINOPHEN 500 MG
500 TABLET ORAL EVERY 6 HOURS PRN
Qty: 28 TABLET | Refills: 0 | Status: SHIPPED | OUTPATIENT
Start: 2025-03-16 | End: 2025-03-25

## 2025-03-16 ASSESSMENT — PAIN SCALES - GENERAL: PAINLEVEL_OUTOF10: 3

## 2025-03-16 ASSESSMENT — PAIN DESCRIPTION - LOCATION: LOCATION: HEAD

## 2025-03-16 ASSESSMENT — PAIN DESCRIPTION - PAIN TYPE: TYPE: ACUTE PAIN

## 2025-03-16 ASSESSMENT — PAIN - FUNCTIONAL ASSESSMENT: PAIN_FUNCTIONAL_ASSESSMENT: 0-10

## 2025-03-17 PROCEDURE — RXMED WILLOW AMBULATORY MEDICATION CHARGE

## 2025-03-17 NOTE — ED PROVIDER NOTES
HPI   Chief Complaint   Patient presents with    Flu Symptoms   This is a 30-year-old female with a past medical history significant for polysubstance abuse and juvenile glaucoma who presents the ED with flulike symptoms.  Patient states that earlier today she developed clear rhinorrhea, nasal congestion, chills and a headache.  Denies fever, cough, chest congestion or sore throat.  Patient states that she is staying at [facility] and has had many potentially sick contacts.  Denies any recent history of travel.  She has not taken any medication today for her symptoms    Patient History   Past Medical History:   Diagnosis Date    Acute vaginitis 03/01/2019    Bacterial vaginosis    Encounter for immunization 11/10/2014    Need for immunization against influenza    Encounter for pre-employment examination 02/24/2017    Physical exam, pre-employment    Encounter for surveillance of injectable contraceptive 11/04/2019    Encounter for Depo-Provera contraception    Ocular pain, right eye 08/31/2017    Eye pain, right    Other chlamydial infection of lower genitourinary tract 09/03/2017    Chlamydia trachomatis infection of lower genitourinary site    Other conditions influencing health status     Gonococcal Cervicitis    Overweight     Overweight (BMI 25.0-29.9)    Person with feared health complaint in whom no diagnosis is made 11/21/2017    Concern about STD in female without diagnosis    Person with feared health complaint in whom no diagnosis is made 03/17/2018    Concern about STD in female without diagnosis    Person with feared health complaint in whom no diagnosis is made 03/22/2018    Concern about STD in female without diagnosis    Person with feared health complaint in whom no diagnosis is made 02/08/2018    Concern about STD in female without diagnosis    Person with feared health complaint in whom no diagnosis is made 01/11/2018    Concern about STD in female without diagnosis     Personal history of diseases of the blood and blood-forming organs and certain disorders involving the immune mechanism 08/05/2015    History of anemia    Personal history of other (healed) physical injury and trauma 06/16/2016    History of sprain of wrist    Personal history of other diseases of the female genital tract 02/28/2019    History of vaginitis    Personal history of other diseases of the female genital tract 09/23/2021    History of vaginal discharge    Personal history of other diseases of the female genital tract 06/19/2018    History of abnormal uterine bleeding    Personal history of other infectious and parasitic diseases 07/09/2020    History of sexually transmitted disease    Personal history of urinary (tract) infections 07/09/2020    History of urinary tract infection    Primary open-angle glaucoma, bilateral, severe stage 03/17/2018    Primary open-angle glaucoma, bilateral, severe stage    Primary open-angle glaucoma, bilateral, severe stage 03/22/2018    Primary open-angle glaucoma, bilateral, severe stage    Primary open-angle glaucoma, bilateral, severe stage 02/08/2018    Primary open-angle glaucoma, bilateral, severe stage    Primary open-angle glaucoma, left eye, severe stage 08/07/2019    Primary open angle glaucoma of left eye, severe stage    Primary open-angle glaucoma, right eye, severe stage 08/07/2019    Primary open angle glaucoma of right eye, severe stage    Primary open-angle glaucoma, unspecified eye, stage unspecified 03/17/2018    Primary open-angle glaucoma, unspecified eye, stage unspecified    Primary open-angle glaucoma, unspecified eye, stage unspecified 03/22/2018    Primary open-angle glaucoma, unspecified eye, stage unspecified    Primary open-angle glaucoma, unspecified eye, stage unspecified 02/08/2018    Primary open-angle glaucoma, unspecified eye, stage unspecified    Unspecified acute and subacute iridocyclitis 07/11/2018    Acute iritis of left eye     Unspecified episcleritis, right eye 08/07/2019    Episcleritis of right eye    Visual discomfort, bilateral 08/07/2019    Photophobia of both eyes     No past surgical history on file.  No family history on file.  Social History     Tobacco Use    Smoking status: Every Day     Types: Cigarettes    Smokeless tobacco: Never   Substance Use Topics    Alcohol use: Yes    Drug use: Yes     Types: Marijuana       Physical Exam   ED Triage Vitals [03/16/25 2029]   Temperature Heart Rate Respirations BP   37.2 °C (99 °F) (!) 107 18 105/61      Pulse Ox Temp Source Heart Rate Source Patient Position   99 % Oral -- --      BP Location FiO2 (%)     -- --       Physical Exam  Constitutional:       General: She is not in acute distress.     Appearance: She is not toxic-appearing or diaphoretic.   HENT:      Head: Normocephalic and atraumatic.      Nose: Congestion and rhinorrhea present.      Comments: Clear rhinorrhea present to bilateral nares     Mouth/Throat:      Mouth: Mucous membranes are moist.      Pharynx: Oropharynx is clear. No oropharyngeal exudate or posterior oropharyngeal erythema.      Comments: No trismus.  No tonsillitis.  Midline nonedematous uvula  Cardiovascular:      Rate and Rhythm: Regular rhythm. Tachycardia present.      Heart sounds: Normal heart sounds. No murmur heard.     No friction rub. No gallop.   Pulmonary:      Effort: Pulmonary effort is normal. No respiratory distress.      Breath sounds: Normal breath sounds. No wheezing, rhonchi or rales.   Abdominal:      General: Bowel sounds are normal. There is no distension.      Palpations: Abdomen is soft.      Tenderness: There is no abdominal tenderness. There is no guarding or rebound.   Musculoskeletal:         General: No deformity or signs of injury.   Skin:     General: Skin is warm and dry.      Coloration: Skin is not pale.   Neurological:      General: No focal deficit present.      Mental Status: She is alert and oriented to person,  place, and time.         ED Course & MDM   ED Course as of 03/16/25 2103   Sun Mar 16, 2025   2051 Offered patient Tylenol or Motrin for her headache, she declined any medication while in the ED [LH]      ED Course User Index  [LH] Rosie Orta PA-C         Diagnoses as of 03/16/25 2103   Flu-like symptoms         Medical Decision Making  This is a 30-year-old female with a past medical history significant for polysubstance abuse and juvenile glaucoma who presents the ED with flulike symptoms.  Patient states that earlier today she developed rhinorrhea, nasal congestion, chills and a headache.  Denies fever, cough, chest congestion or sore throat.    On physical exam, patient is not ill-appearing, nondiaphoretic and in no acute distress.  There is nasal congestion with clear rhinorrhea present to bilateral nares.  Mucous membranes are moist.  No posterior oropharyngeal erythema or exudates.  No tonsillitis.  Midline nonedematous uvula.  Heart sounds are tachycardic with a regular rhythm.  Lungs are clear to auscultation bilaterally, no wheezing, rales or rhonchi    Obtained viral swabs for COVID, influenza and RSV.  Viral swabs results are still pending, patient states that she needs to leave as her ride is here. She states that she has a [place] [facility] and will get her results from there.      Prescribed Tylenol, Flonase and Zyrtec for symptom management.  Counseled patient to follow-up with her PCP.  Discussed ED return precautions.  Patient verbalized understanding was agreeable to plan of care.  Discharged in stable condition.            Rosie Orta PA-C  03/16/25 2143

## 2025-03-18 ENCOUNTER — PHARMACY VISIT (OUTPATIENT)
Dept: PHARMACY | Facility: CLINIC | Age: 30
End: 2025-03-18
Payer: MEDICAID

## 2025-04-03 ENCOUNTER — APPOINTMENT (OUTPATIENT)
Dept: DERMATOLOGY | Facility: CLINIC | Age: 30
End: 2025-04-03
Payer: COMMERCIAL

## 2025-04-17 ENCOUNTER — APPOINTMENT (OUTPATIENT)
Dept: OPHTHALMOLOGY | Facility: CLINIC | Age: 30
End: 2025-04-17
Payer: COMMERCIAL

## 2025-04-17 DIAGNOSIS — H25.813 COMBINED FORMS OF AGE-RELATED CATARACT OF BOTH EYES: Primary | ICD-10-CM

## 2025-04-17 DIAGNOSIS — Q15.0 JUVENILE GLAUCOMA: ICD-10-CM

## 2025-04-17 PROCEDURE — 92136 OPHTHALMIC BIOMETRY: CPT | Mod: BILATERAL PROCEDURE | Performed by: OPHTHALMOLOGY

## 2025-04-17 PROCEDURE — 99214 OFFICE O/P EST MOD 30 MIN: CPT | Performed by: OPHTHALMOLOGY

## 2025-04-17 PROCEDURE — 92136 OPHTHALMIC BIOMETRY: CPT | Performed by: OPHTHALMOLOGY

## 2025-04-17 PROCEDURE — RXMED WILLOW AMBULATORY MEDICATION CHARGE

## 2025-04-17 RX ORDER — DORZOLAMIDE HYDROCHLORIDE AND TIMOLOL MALEATE 20; 5 MG/ML; MG/ML
1 SOLUTION/ DROPS OPHTHALMIC 2 TIMES DAILY
Qty: 30 ML | Refills: 3 | Status: SHIPPED | OUTPATIENT
Start: 2025-04-17 | End: 2026-04-17

## 2025-04-17 RX ORDER — LATANOPROST 50 UG/ML
1 SOLUTION/ DROPS OPHTHALMIC NIGHTLY
Qty: 7.5 ML | Refills: 3 | Status: SHIPPED | OUTPATIENT
Start: 2025-04-17 | End: 2026-04-17

## 2025-04-17 RX ORDER — MOXIFLOXACIN 5 MG/ML
1 SOLUTION/ DROPS OPHTHALMIC
OUTPATIENT
Start: 2025-04-17 | End: 2025-04-17

## 2025-04-17 RX ORDER — CYCLOPENTOLATE HYDROCHLORIDE 10 MG/ML
1 SOLUTION/ DROPS OPHTHALMIC
OUTPATIENT
Start: 2025-04-17 | End: 2025-04-17

## 2025-04-17 RX ORDER — TROPICAMIDE 10 MG/ML
1 SOLUTION/ DROPS OPHTHALMIC
OUTPATIENT
Start: 2025-04-17 | End: 2025-04-17

## 2025-04-17 RX ORDER — DICLOFENAC SODIUM 1 MG/ML
1 SOLUTION/ DROPS OPHTHALMIC
OUTPATIENT
Start: 2025-04-17 | End: 2025-04-17

## 2025-04-17 RX ORDER — BRIMONIDINE TARTRATE 2 MG/ML
1 SOLUTION/ DROPS OPHTHALMIC 2 TIMES DAILY
Qty: 30 ML | Refills: 3 | Status: SHIPPED | OUTPATIENT
Start: 2025-04-17 | End: 2026-04-17

## 2025-04-17 RX ORDER — PHENYLEPHRINE HYDROCHLORIDE 25 MG/ML
1 SOLUTION/ DROPS OPHTHALMIC
OUTPATIENT
Start: 2025-04-17 | End: 2025-04-17

## 2025-04-17 ASSESSMENT — PACHYMETRY
OD_CT(UM): 584
OS_CT(UM): 596

## 2025-04-17 ASSESSMENT — GONIOSCOPY
OS_NASAL: C40F 2+
OS_INFERIOR: PAS
OS_SUPERIOR: PAS
OS_TEMPORAL: PAS

## 2025-04-17 ASSESSMENT — ENCOUNTER SYMPTOMS
HEMATOLOGIC/LYMPHATIC NEGATIVE: 0
RESPIRATORY NEGATIVE: 0
MUSCULOSKELETAL NEGATIVE: 0
PSYCHIATRIC NEGATIVE: 0
CONSTITUTIONAL NEGATIVE: 0
ALLERGIC/IMMUNOLOGIC NEGATIVE: 0
CARDIOVASCULAR NEGATIVE: 0
GASTROINTESTINAL NEGATIVE: 0
NEUROLOGICAL NEGATIVE: 0
EYES NEGATIVE: 1
ENDOCRINE NEGATIVE: 0

## 2025-04-17 ASSESSMENT — VISUAL ACUITY
OS_SC: HM
METHOD: SNELLEN - LINEAR
OD_SC: LP

## 2025-04-17 ASSESSMENT — TONOMETRY
OD_IOP_MMHG: 19
IOP_METHOD: GOLDMANN APPLANATION
OS_IOP_MMHG: 25

## 2025-04-17 ASSESSMENT — CUP TO DISC RATIO
OS_RATIO: .8
OD_RATIO: .99

## 2025-04-17 ASSESSMENT — SLIT LAMP EXAM - LIDS
COMMENTS: GOOD POSITION
COMMENTS: GOOD POSITION

## 2025-04-17 ASSESSMENT — EXTERNAL EXAM - RIGHT EYE: OD_EXAM: NORMAL

## 2025-04-17 ASSESSMENT — EXTERNAL EXAM - LEFT EYE: OS_EXAM: NORMAL

## 2025-04-17 NOTE — PROGRESS NOTES
Juvenile glaucoma OU, severe OU : CCT: 584/596. s/p TUBE shunts OU with Dr. 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     Notes  Severe defect Os, stable from previous             IOP is up OS patient remains non-compliant with drops      Continue dorzol-timolol BID OU    continue latan qhs OU    Continue brimonidine  BID ou    Given below will proceed with ce/iol/goniotomy/tube needling OS      Clinton Camp 30 y.o. presents with visually significant cataract of left eye(s). The cataract(s) is/are affecting activities of daily living including reading and watching tv. It is believed removal of the cataract will improve vision and thus quality of life. After discussing r/b/a to surgery the patient elected to proceedleft eye only. Lens options were discussed including pros/cons/and eligibility for monofocal, toric, multifocal, and toric multifocal lenses and patient elected to proceed with monofocal. patient's current mrx is plano. target after surgery will be plano. patient has hx of severe glaucoma that my effect surgery or post surgical visual outcome.  The patient was told to continue all medications through surgery. anesthesia will planned to be mac with subtenons block. Will combine with above to help with IOP

## 2025-04-19 ENCOUNTER — PHARMACY VISIT (OUTPATIENT)
Dept: PHARMACY | Facility: CLINIC | Age: 30
End: 2025-04-19
Payer: MEDICAID

## 2025-04-21 PROBLEM — H25.813 COMBINED FORMS OF AGE-RELATED CATARACT OF BOTH EYES: Status: ACTIVE | Noted: 2025-04-17

## 2025-04-21 PROBLEM — Q15.0 JUVENILE GLAUCOMA: Status: ACTIVE | Noted: 2025-04-17

## 2025-04-23 ENCOUNTER — APPOINTMENT (OUTPATIENT)
Dept: DERMATOLOGY | Facility: CLINIC | Age: 30
End: 2025-04-23
Payer: COMMERCIAL

## 2025-04-23 DIAGNOSIS — L71.0 PERIORAL DERMATITIS: Primary | ICD-10-CM

## 2025-04-23 PROCEDURE — RXMED WILLOW AMBULATORY MEDICATION CHARGE

## 2025-04-23 PROCEDURE — 99204 OFFICE O/P NEW MOD 45 MIN: CPT | Performed by: DERMATOLOGY

## 2025-04-23 RX ORDER — MINOCYCLINE HYDROCHLORIDE 100 MG/1
TABLET ORAL
Qty: 60 TABLET | Refills: 0 | Status: SHIPPED | OUTPATIENT
Start: 2025-04-23

## 2025-04-23 RX ORDER — CLINDAMYCIN PHOSPHATE 10 UG/ML
LOTION TOPICAL
Qty: 60 ML | Refills: 3 | Status: SHIPPED | OUTPATIENT
Start: 2025-04-23

## 2025-04-23 NOTE — PROGRESS NOTES
Virtual or Telephone Consent    An interactive audio and video telecommunication system which permits real time communications between the patient (at the originating site) and provider (at the distant site) was utilized to provide this telehealth service.   Verbal consent was requested and obtained from Clinton Camp on this date, 04/23/25 for a telehealth visit and the patient's location was confirmed at the time of the visit.     Subjective     Clinton Camp is a 30 y.o. female who presents for the following: Rash (Patient reports facial rash for the 2-3 years that comes and goes. Rash is around the mouth and itchy.//Patient went to [facility] [place] [facility] for this rash and was prescribed hydrocortisone 2.5% cream for 3 weeks, discontinued 2 weeks ago but it was worsening. Now patient is using clindamycin and benzoyl peroxide cream for 2 weeks. Getting a little better.).     Review of Systems:  No other skin or systemic complaints other than what is documented elsewhere in the note.    The following portions of the chart were reviewed this encounter and updated as appropriate:          Skin Cancer History  Biopsy Log Book  No skin cancers from Specimen Tracking.    Additional History      Specialty Problems    None       Objective   Well appearing patient in no apparent distress; mood and affect are within normal limits.    A focused skin examination was performed. All findings within normal limits unless otherwise noted below.    Assessment/Plan   Skin Exam  1. PERIORAL DERMATITIS  Head - Anterior (Face)  Scattered erythematous monomorphic papules. Some hyperpigmented papules on lower cutaneous lips and periorally cheek  Periorificial dermatitis is a common acneiform condition which presents with erythematous papules, pustules surrounding the perioral, perinasal, and periocular area.  Occasionally flesh colored papules or nodules may be noted.  When papules resolve there can be residual erythema and scaling.  The  etiology is unknown, but is occasionally thought to be related to high potency topical steroid use in these areas.  It is generally a self limited condition.  Resolution can take months to years, and the lesions can resolve with scarring.  Treatment options including topical antibiotics, oral antibiotics in severe cases.     - Start minocycline 100mg twice daily x 1 month. Side effects of oral minocycline were reviewed including upset stomach, headache, dizziness, rash and discoloration of teeth, fingernails and skin. Patient advised to take medication with non-dairy food and water. Patient to call should they experience side effect or concern with medication. Patient aware to avoid pregnancy while on medication.  - Switch to clindamycin 1% lotion, stop clindamycin/benzoyl peroxide cream as this can be irritating  - stop flonase and hydrocortisone cream as this can worsen condition  Related Medications  minocycline (Dynacin) 100 mg tablet  Take 1 capsule by mouth twice daily  clindamycin (Cleocin T) 1 % lotion  Apply to face once daily in the morning.    Follow up as needed    Adrianna Ho MD   PGY4, Department of Dermatology    I saw and evaluated the patient. I personally obtained the key and critical portions of the history and physical exam or was physically present for key and critical portions performed by the resident/fellow. I reviewed the resident/fellow's documentation and discussed the patient with the resident/fellow. I agree with the resident/fellow's medical decision making as documented in the note.    Enrrique Alvarez MD PhD

## 2025-04-23 NOTE — Clinical Note
Scattered erythematous monomorphic papules. Some hyperpigmented papules on lower cutaneous lips and periorally cheek

## 2025-04-23 NOTE — Clinical Note
Periorificial dermatitis is a common acneiform condition which presents with erythematous papules, pustules surrounding the perioral, perinasal, and periocular area.  Occasionally flesh colored papules or nodules may be noted.  When papules resolve there can be residual erythema and scaling.  The etiology is unknown, but is occasionally thought to be related to high potency topical steroid use in these areas.  It is generally a self limited condition.  Resolution can take months to years, and the lesions can resolve with scarring.  Treatment options including topical antibiotics, oral antibiotics in severe cases.     - Start minocycline 100mg twice daily x 1 month. Side effects of oral minocycline were reviewed including upset stomach, headache, dizziness, rash and discoloration of teeth, fingernails and skin. Patient advised to take medication with non-dairy food and water. Patient to call should they experience side effect or concern with medication. Patient aware to avoid pregnancy while on medication.  - Switch to clindamycin 1% lotion, stop clindamycin/benzoyl peroxide cream as this can be irritating  - stop flonase and hydrocortisone cream as this can worsen condition

## 2025-04-24 ENCOUNTER — PHARMACY VISIT (OUTPATIENT)
Dept: PHARMACY | Facility: CLINIC | Age: 30
End: 2025-04-24
Payer: MEDICAID

## 2025-05-06 PROCEDURE — RXMED WILLOW AMBULATORY MEDICATION CHARGE

## 2025-05-08 ENCOUNTER — PHARMACY VISIT (OUTPATIENT)
Dept: PHARMACY | Facility: CLINIC | Age: 30
End: 2025-05-08
Payer: MEDICAID

## 2025-05-09 DIAGNOSIS — H40.9 GLAUCOMA OF BOTH EYES, UNSPECIFIED GLAUCOMA TYPE: ICD-10-CM

## 2025-05-09 DIAGNOSIS — Z98.42 CATARACT EXTRACTION STATUS OF LEFT EYE: Primary | ICD-10-CM

## 2025-05-09 PROCEDURE — RXMED WILLOW AMBULATORY MEDICATION CHARGE

## 2025-05-09 RX ORDER — PREDNISOLONE ACETATE 10 MG/ML
1 SUSPENSION/ DROPS OPHTHALMIC 4 TIMES DAILY
COMMUNITY
End: 2025-05-09 | Stop reason: SDUPTHER

## 2025-05-09 RX ORDER — MOXIFLOXACIN 5 MG/ML
1 SOLUTION/ DROPS OPHTHALMIC 4 TIMES DAILY
Qty: 3 ML | Refills: 0 | Status: SHIPPED | OUTPATIENT
Start: 2025-05-09

## 2025-05-09 RX ORDER — PREDNISOLONE ACETATE 10 MG/ML
1 SUSPENSION/ DROPS OPHTHALMIC 4 TIMES DAILY
Qty: 5 ML | Refills: 0 | Status: SHIPPED | OUTPATIENT
Start: 2025-05-09

## 2025-05-09 RX ORDER — MOXIFLOXACIN 5 MG/ML
1 SOLUTION/ DROPS OPHTHALMIC 4 TIMES DAILY
COMMUNITY
End: 2025-05-09 | Stop reason: SDUPTHER

## 2025-05-12 ENCOUNTER — PHARMACY VISIT (OUTPATIENT)
Dept: PHARMACY | Facility: CLINIC | Age: 30
End: 2025-05-12
Payer: MEDICAID

## 2025-05-13 ENCOUNTER — ANESTHESIA (OUTPATIENT)
Dept: OPERATING ROOM | Facility: CLINIC | Age: 30
End: 2025-05-13
Payer: COMMERCIAL

## 2025-05-13 ENCOUNTER — HOSPITAL ENCOUNTER (OUTPATIENT)
Facility: CLINIC | Age: 30
Setting detail: OUTPATIENT SURGERY
Discharge: HOME | End: 2025-05-13
Attending: OPHTHALMOLOGY | Admitting: OPHTHALMOLOGY
Payer: COMMERCIAL

## 2025-05-13 ENCOUNTER — ANESTHESIA EVENT (OUTPATIENT)
Dept: OPERATING ROOM | Facility: CLINIC | Age: 30
End: 2025-05-13
Payer: COMMERCIAL

## 2025-05-13 VITALS
OXYGEN SATURATION: 100 % | TEMPERATURE: 97.2 F | WEIGHT: 129.41 LBS | SYSTOLIC BLOOD PRESSURE: 122 MMHG | RESPIRATION RATE: 16 BRPM | BODY MASS INDEX: 29.11 KG/M2 | HEART RATE: 75 BPM | DIASTOLIC BLOOD PRESSURE: 75 MMHG | HEIGHT: 56 IN

## 2025-05-13 DIAGNOSIS — H25.813 COMBINED FORMS OF AGE-RELATED CATARACT OF BOTH EYES: Primary | ICD-10-CM

## 2025-05-13 DIAGNOSIS — Q15.0 JUVENILE GLAUCOMA: ICD-10-CM

## 2025-05-13 PROCEDURE — 2500000005 HC RX 250 GENERAL PHARMACY W/O HCPCS: Mod: SE | Performed by: OPHTHALMOLOGY

## 2025-05-13 PROCEDURE — 2500000004 HC RX 250 GENERAL PHARMACY W/ HCPCS (ALT 636 FOR OP/ED): Mod: SE | Performed by: OPHTHALMOLOGY

## 2025-05-13 PROCEDURE — 2500000004 HC RX 250 GENERAL PHARMACY W/ HCPCS (ALT 636 FOR OP/ED): Mod: JZ,SE

## 2025-05-13 PROCEDURE — 7100000009 HC PHASE TWO TIME - INITIAL BASE CHARGE: Performed by: OPHTHALMOLOGY

## 2025-05-13 PROCEDURE — 2720000007 HC OR 272 NO HCPCS: Performed by: OPHTHALMOLOGY

## 2025-05-13 PROCEDURE — 7100000010 HC PHASE TWO TIME - EACH INCREMENTAL 1 MINUTE: Performed by: OPHTHALMOLOGY

## 2025-05-13 PROCEDURE — 2760000001 HC OR 276 NO HCPCS: Performed by: OPHTHALMOLOGY

## 2025-05-13 PROCEDURE — 2500000004 HC RX 250 GENERAL PHARMACY W/ HCPCS (ALT 636 FOR OP/ED): Mod: SE | Performed by: ANESTHESIOLOGY

## 2025-05-13 PROCEDURE — 3600000003 HC OR TIME - INITIAL BASE CHARGE - PROCEDURE LEVEL THREE: Performed by: OPHTHALMOLOGY

## 2025-05-13 PROCEDURE — A65820 PR RELIEVE INNER EYE PRESSURE

## 2025-05-13 PROCEDURE — 3700000001 HC GENERAL ANESTHESIA TIME - INITIAL BASE CHARGE: Performed by: OPHTHALMOLOGY

## 2025-05-13 PROCEDURE — 66184 REVISION OF AQUEOUS SHUNT: CPT | Performed by: OPHTHALMOLOGY

## 2025-05-13 PROCEDURE — 3700000002 HC GENERAL ANESTHESIA TIME - EACH INCREMENTAL 1 MINUTE: Performed by: OPHTHALMOLOGY

## 2025-05-13 PROCEDURE — 66982 XCAPSL CTRC RMVL CPLX WO ECP: CPT | Performed by: OPHTHALMOLOGY

## 2025-05-13 PROCEDURE — A65820 PR RELIEVE INNER EYE PRESSURE: Performed by: ANESTHESIOLOGY

## 2025-05-13 PROCEDURE — V2632 POST CHMBR INTRAOCULAR LENS: HCPCS | Performed by: OPHTHALMOLOGY

## 2025-05-13 PROCEDURE — 3600000008 HC OR TIME - EACH INCREMENTAL 1 MINUTE - PROCEDURE LEVEL THREE: Performed by: OPHTHALMOLOGY

## 2025-05-13 DEVICE — CLAREON ASPHERIC HYDROPHOBIC ACRYLIC IOL WITH THE AUTONOMEAUTOMATED PRE-LOADED DELIVERY SYSTEM
Type: IMPLANTABLE DEVICE | Site: EYE | Status: FUNCTIONAL
Brand: CLAREON™

## 2025-05-13 RX ORDER — LIDOCAINE HYDROCHLORIDE 10 MG/ML
0.1 INJECTION, SOLUTION EPIDURAL; INFILTRATION; INTRACAUDAL; PERINEURAL ONCE
Status: DISCONTINUED | OUTPATIENT
Start: 2025-05-13 | End: 2025-05-13 | Stop reason: HOSPADM

## 2025-05-13 RX ORDER — ONDANSETRON 4 MG/1
4 TABLET, ORALLY DISINTEGRATING ORAL
Status: DISCONTINUED | OUTPATIENT
Start: 2025-05-13 | End: 2025-05-13 | Stop reason: HOSPADM

## 2025-05-13 RX ORDER — TROPICAMIDE 10 MG/ML
1 SOLUTION/ DROPS OPHTHALMIC
Status: COMPLETED | OUTPATIENT
Start: 2025-05-13 | End: 2025-05-13

## 2025-05-13 RX ORDER — MOXIFLOXACIN 5 MG/ML
SOLUTION/ DROPS OPHTHALMIC AS NEEDED
Status: DISCONTINUED | OUTPATIENT
Start: 2025-05-13 | End: 2025-05-13 | Stop reason: HOSPADM

## 2025-05-13 RX ORDER — PREDNISOLONE ACETATE 10 MG/ML
SUSPENSION/ DROPS OPHTHALMIC AS NEEDED
Status: DISCONTINUED | OUTPATIENT
Start: 2025-05-13 | End: 2025-05-13 | Stop reason: HOSPADM

## 2025-05-13 RX ORDER — PHENYLEPHRINE HYDROCHLORIDE 25 MG/ML
1 SOLUTION/ DROPS OPHTHALMIC
Status: COMPLETED | OUTPATIENT
Start: 2025-05-13 | End: 2025-05-13

## 2025-05-13 RX ORDER — DICLOFENAC SODIUM 1 MG/ML
1 SOLUTION/ DROPS OPHTHALMIC
Status: COMPLETED | OUTPATIENT
Start: 2025-05-13 | End: 2025-05-13

## 2025-05-13 RX ORDER — MIDAZOLAM HYDROCHLORIDE 1 MG/ML
INJECTION, SOLUTION INTRAMUSCULAR; INTRAVENOUS AS NEEDED
Status: DISCONTINUED | OUTPATIENT
Start: 2025-05-13 | End: 2025-05-13

## 2025-05-13 RX ORDER — MOXIFLOXACIN 5 MG/ML
1 SOLUTION/ DROPS OPHTHALMIC
Status: COMPLETED | OUTPATIENT
Start: 2025-05-13 | End: 2025-05-13

## 2025-05-13 RX ORDER — ONDANSETRON HYDROCHLORIDE 2 MG/ML
4 INJECTION, SOLUTION INTRAVENOUS ONCE AS NEEDED
Status: DISCONTINUED | OUTPATIENT
Start: 2025-05-13 | End: 2025-05-13 | Stop reason: HOSPADM

## 2025-05-13 RX ORDER — FENTANYL CITRATE 50 UG/ML
INJECTION, SOLUTION INTRAMUSCULAR; INTRAVENOUS AS NEEDED
Status: DISCONTINUED | OUTPATIENT
Start: 2025-05-13 | End: 2025-05-13

## 2025-05-13 RX ORDER — ACETAMINOPHEN 325 MG/1
650 TABLET ORAL EVERY 4 HOURS PRN
Status: DISCONTINUED | OUTPATIENT
Start: 2025-05-13 | End: 2025-05-13 | Stop reason: HOSPADM

## 2025-05-13 RX ORDER — CYCLOPENTOLATE HYDROCHLORIDE 10 MG/ML
1 SOLUTION/ DROPS OPHTHALMIC
Status: COMPLETED | OUTPATIENT
Start: 2025-05-13 | End: 2025-05-13

## 2025-05-13 RX ADMIN — CYCLOPENTOLATE HYDROCHLORIDE 1 DROP: 10 SOLUTION/ DROPS OPHTHALMIC at 12:10

## 2025-05-13 RX ADMIN — PHENYLEPHRINE HYDROCHLORIDE 1 DROP: 25 SOLUTION/ DROPS OPHTHALMIC at 12:04

## 2025-05-13 RX ADMIN — DICLOFENAC SODIUM 1 DROP: 1 SOLUTION OPHTHALMIC at 12:15

## 2025-05-13 RX ADMIN — CYCLOPENTOLATE HYDROCHLORIDE 1 DROP: 10 SOLUTION/ DROPS OPHTHALMIC at 12:15

## 2025-05-13 RX ADMIN — CYCLOPENTOLATE HYDROCHLORIDE 1 DROP: 10 SOLUTION/ DROPS OPHTHALMIC at 12:04

## 2025-05-13 RX ADMIN — FENTANYL CITRATE 25 MCG: 50 INJECTION, SOLUTION INTRAMUSCULAR; INTRAVENOUS at 14:29

## 2025-05-13 RX ADMIN — PHENYLEPHRINE HYDROCHLORIDE 1 DROP: 25 SOLUTION/ DROPS OPHTHALMIC at 12:15

## 2025-05-13 RX ADMIN — TROPICAMIDE 1 DROP: 10 SOLUTION/ DROPS OPHTHALMIC at 12:04

## 2025-05-13 RX ADMIN — DICLOFENAC SODIUM 1 DROP: 1 SOLUTION OPHTHALMIC at 12:04

## 2025-05-13 RX ADMIN — MOXIFLOXACIN OPHTHALMIC 1 DROP: 5 SOLUTION/ DROPS OPHTHALMIC at 12:04

## 2025-05-13 RX ADMIN — DICLOFENAC SODIUM 1 DROP: 1 SOLUTION OPHTHALMIC at 12:10

## 2025-05-13 RX ADMIN — FENTANYL CITRATE 25 MCG: 50 INJECTION, SOLUTION INTRAMUSCULAR; INTRAVENOUS at 14:12

## 2025-05-13 RX ADMIN — ONDANSETRON 4 MG: 4 TABLET, ORALLY DISINTEGRATING ORAL at 15:00

## 2025-05-13 RX ADMIN — TROPICAMIDE 1 DROP: 10 SOLUTION/ DROPS OPHTHALMIC at 12:15

## 2025-05-13 RX ADMIN — PHENYLEPHRINE HYDROCHLORIDE 1 DROP: 25 SOLUTION/ DROPS OPHTHALMIC at 12:10

## 2025-05-13 RX ADMIN — MOXIFLOXACIN OPHTHALMIC 1 DROP: 5 SOLUTION/ DROPS OPHTHALMIC at 12:10

## 2025-05-13 RX ADMIN — MOXIFLOXACIN OPHTHALMIC 1 DROP: 5 SOLUTION/ DROPS OPHTHALMIC at 12:15

## 2025-05-13 RX ADMIN — MIDAZOLAM HYDROCHLORIDE 2 MG: 1 INJECTION, SOLUTION INTRAMUSCULAR; INTRAVENOUS at 14:03

## 2025-05-13 RX ADMIN — FENTANYL CITRATE 50 MCG: 50 INJECTION, SOLUTION INTRAMUSCULAR; INTRAVENOUS at 14:03

## 2025-05-13 RX ADMIN — TROPICAMIDE 1 DROP: 10 SOLUTION/ DROPS OPHTHALMIC at 12:10

## 2025-05-13 ASSESSMENT — PAIN SCALES - GENERAL: PAIN_LEVEL: 0

## 2025-05-13 ASSESSMENT — COLUMBIA-SUICIDE SEVERITY RATING SCALE - C-SSRS
1. IN THE PAST MONTH, HAVE YOU WISHED YOU WERE DEAD OR WISHED YOU COULD GO TO SLEEP AND NOT WAKE UP?: NO
6. HAVE YOU EVER DONE ANYTHING, STARTED TO DO ANYTHING, OR PREPARED TO DO ANYTHING TO END YOUR LIFE?: NO
2. HAVE YOU ACTUALLY HAD ANY THOUGHTS OF KILLING YOURSELF?: NO
2. HAVE YOU ACTUALLY HAD ANY THOUGHTS OF KILLING YOURSELF?: NO
1. IN THE PAST MONTH, HAVE YOU WISHED YOU WERE DEAD OR WISHED YOU COULD GO TO SLEEP AND NOT WAKE UP?: NO
6. HAVE YOU EVER DONE ANYTHING, STARTED TO DO ANYTHING, OR PREPARED TO DO ANYTHING TO END YOUR LIFE?: NO

## 2025-05-13 NOTE — ANESTHESIA PREPROCEDURE EVALUATION
Patient: Clinton Camp    Procedure Information       Anesthesia Start Date/Time: 05/13/25 1400    Procedures:       Extraction Cataract with Insertion Intraocular Lens (Left: Eye)      Goniotomy (Left: Eye)      REVISION, AQUEOUS SHUNT, EYE (Left)    [facility]    Surgeons: Daina Wheeler MD            Relevant Problems   HEENT   (+) Juvenile glaucoma       Clinical information reviewed:   Tobacco  Allergies  Meds   Med Hx  Surg Hx   Fam Hx  Soc Hx        NPO Detail:  NPO/Void Status  Date of Last Liquid: 05/12/25  Time of Last Liquid: 2200  Date of Last Solid: 05/12/25  Time of Last Solid: 2200         Physical Exam    Airway  Mallampati: II  TM distance: >3 FB     Cardiovascular   Rhythm: regular     Dental - normal exam     Pulmonary Breath sounds clear to auscultation     Abdominal Abdomen: soft             Anesthesia Plan    History of general anesthesia?: yes  History of complications of general anesthesia?: no    ASA 2     MAC     intravenous induction   Anesthetic plan and risks discussed with patient.    Plan discussed with CRNA and CAA.

## 2025-05-13 NOTE — ANESTHESIA POSTPROCEDURE EVALUATION
Patient: Clinton Camp    Procedure Summary       Date: 05/13/25 Room / Location: [facility] / [facility]    Anesthesia Start: 1400 Anesthesia Stop: 1447    Procedures:       Extraction Cataract with Insertion Intraocular Lens (Left: Eye)      Goniotomy (Left: Eye)      REVISION, AQUEOUS SHUNT, EYE (Left: Eye) Diagnosis:       Juvenile glaucoma      Combined forms of age-related cataract of both eyes      (Juvenile glaucoma [Q15.0])      (Combined forms of age-related cataract of both eyes [H25.813])    Surgeons: Daina Wheeler MD Responsible Provider: Manas Chin MD    Anesthesia Type: MAC ASA Status: 2            Anesthesia Type: MAC    Vitals Value Taken Time   /76 05/13/25 14:53   Temp 36.2 °C (97.2 °F) 05/13/25 14:53   Pulse 74 05/13/25 14:53   Resp 16 05/13/25 14:53   SpO2 100 % 05/13/25 14:53       Anesthesia Post Evaluation    Patient location during evaluation: PACU  Patient participation: complete - patient participated  Level of consciousness: awake  Pain score: 0  Pain management: adequate  Airway patency: patent  Cardiovascular status: acceptable  Respiratory status: acceptable  Hydration status: acceptable  Postoperative Nausea and Vomiting: mild        There were no known notable events for this encounter.

## 2025-05-13 NOTE — H&P
History Of Present Illness  Clinton Camp is a 30 y.o. female presenting with juvenile glaucoma here for cataract extraction with intraocular lens implantation, goniotomy and tube shunt revision left eye.     Past Medical History  Medical History[1]  Current Outpatient Medications   Medication Instructions    brimonidine (AlphaGAN) 0.2 % ophthalmic solution 1 drop, Both Eyes, 2 times daily    cetirizine (ZYRTEC) 10 mg, oral, Daily    clindamycin (Cleocin T) 1 % lotion Apply to face once daily in the morning.    dorzolamide-timoloL (Cosopt) 22.3-6.8 mg/mL ophthalmic solution 1 drop, Both Eyes, 2 times daily    fluticasone (Flonase) 50 mcg/actuation nasal spray 2 sprays, Each Nostril, Daily, Shake gently. Before first use, prime pump. After use, clean tip and replace cap.    latanoprost (Xalatan) 0.005 % ophthalmic solution 1 drop, Both Eyes, Nightly    medroxyPROGESTERone (DEPO-PROVERA) 150 mg, intramuscular, Every 3 months, Please bring to office for injection    minocycline (Dynacin) 100 mg tablet Take 1 capsule by mouth twice daily    moxifloxacin (Vigamox) 0.5 % ophthalmic solution 1 drop, Left Eye, 4 times daily    prednisoLONE acetate (Pred-Forte) 1 % ophthalmic suspension 1 drop, Left Eye, 4 times daily    soft lens rinse,store solution (Sensitive Eyes Plus Saline) solution USE AS DIRECTED.    timolol (Timoptic) 0.5 % ophthalmic solution 1 drop, 2 times daily       Surgical History  Surgical History[2]     Social History  She reports that she has been smoking cigarettes. She has never used smokeless tobacco. She reports current alcohol use. She reports current drug use. Drug: Marijuana.    Family History  Family History[3]     Allergies  Patient has no known allergies.    Review of Systems  Patient denies ocular pain, redness, discharge, decreased vision, double vision, blind spots, flashes, or floaters.      Physical Exam  Constitutional: No acute distress   HEENT: mucus membranes moist, atraumatic  "  Respiratory: no respiratory distress   Cardiovascular: no peripheral edema  Abdomen: non distended   MSK/neuro: moves all extremities spontaneously   Skin: no rashes   Psych: alert and oriented      Last Recorded Vitals  Blood pressure 115/59, pulse 83, temperature 36.7 °C (98.1 °F), temperature source Temporal, resp. rate 16, height 1.422 m (4' 8\"), weight 58.7 kg (129 lb 6.6 oz), SpO2 100%.    Relevant Results  No results found for this or any previous visit (from the past 24 hours).       Assessment & Plan  Combined forms of age-related cataract of both eyes    Juvenile glaucoma      - Plan today for cataract extraction with intraocular lens implantation, goniotomy and shunt revision left eye.     Mikel Caldwell MD         [1]   Past Medical History:  Diagnosis Date    Acute vaginitis 03/01/2019    Bacterial vaginosis    Encounter for immunization 11/10/2014    Need for immunization against influenza    Encounter for pre-employment examination 02/24/2017    Physical exam, pre-employment    Encounter for surveillance of injectable contraceptive 11/04/2019    Encounter for Depo-Provera contraception    Ocular pain, right eye 08/31/2017    Eye pain, right    Other chlamydial infection of lower genitourinary tract 09/03/2017    Chlamydia trachomatis infection of lower genitourinary site    Other conditions influencing health status     Gonococcal Cervicitis    Overweight     Overweight (BMI 25.0-29.9)    Person with feared health complaint in whom no diagnosis is made 11/21/2017    Concern about STD in female without diagnosis    Person with feared health complaint in whom no diagnosis is made 03/17/2018    Concern about STD in female without diagnosis    Person with feared health complaint in whom no diagnosis is made 03/22/2018    Concern about STD in female without diagnosis    Person with feared health complaint in whom no diagnosis is made 02/08/2018    Concern about STD in female without diagnosis    " Person with feared health complaint in whom no diagnosis is made 01/11/2018    Concern about STD in female without diagnosis    Personal history of diseases of the blood and blood-forming organs and certain disorders involving the immune mechanism 08/05/2015    History of anemia    Personal history of other (healed) physical injury and trauma 06/16/2016    History of sprain of wrist    Personal history of other diseases of the female genital tract 02/28/2019    History of vaginitis    Personal history of other diseases of the female genital tract 09/23/2021    History of vaginal discharge    Personal history of other diseases of the female genital tract 06/19/2018    History of abnormal uterine bleeding    Personal history of other infectious and parasitic diseases 07/09/2020    History of sexually transmitted disease    Personal history of urinary (tract) infections 07/09/2020    History of urinary tract infection    Primary open-angle glaucoma, bilateral, severe stage 03/17/2018    Primary open-angle glaucoma, bilateral, severe stage    Primary open-angle glaucoma, bilateral, severe stage 03/22/2018    Primary open-angle glaucoma, bilateral, severe stage    Primary open-angle glaucoma, bilateral, severe stage 02/08/2018    Primary open-angle glaucoma, bilateral, severe stage    Primary open-angle glaucoma, left eye, severe stage 08/07/2019    Primary open angle glaucoma of left eye, severe stage    Primary open-angle glaucoma, right eye, severe stage 08/07/2019    Primary open angle glaucoma of right eye, severe stage    Primary open-angle glaucoma, unspecified eye, stage unspecified 03/17/2018    Primary open-angle glaucoma, unspecified eye, stage unspecified    Primary open-angle glaucoma, unspecified eye, stage unspecified 03/22/2018    Primary open-angle glaucoma, unspecified eye, stage unspecified    Primary open-angle glaucoma, unspecified eye, stage unspecified 02/08/2018    Primary open-angle glaucoma,  unspecified eye, stage unspecified    Unspecified acute and subacute iridocyclitis 07/11/2018    Acute iritis of left eye    Unspecified episcleritis, right eye 08/07/2019    Episcleritis of right eye    Visual discomfort, bilateral 08/07/2019    Photophobia of both eyes   [2] History reviewed. No pertinent surgical history.  [3] No family history on file.

## 2025-05-13 NOTE — DISCHARGE INSTRUCTIONS
Surgeon: Daina Wheeler MD    Patient name: Clinton Camp    Date of visit:May 13, 2025      left cataract surgery post op instructions    Drops after surgery:  Moxifloxacin (tan)  Prednisolone (white/pink)    Start these drops at 6 pm tonight and use them again before bed  Starting day after surgery use drop 4x a day (breakfast, lunch, dinner, bedtime)  Wait 5 minutes in between drops    Bring all drops with you to your appointment tomorrow    No heavy lifting over 10-15 lbs, no bending over, do not get eye wet, no eye rubbing. Wear eye shield at anytime you are sleeping. Shower from neck down only. Please call immediately if you develop worsening redness, pain, decreased vision, flashes, floaters.       During regular business hours call: [phone] ([phone]), choose option for [place], and Dr. Wheeler [place]  After hours call hospital : [phone], ask to speak with on-call ophthalmology resident

## 2025-05-13 NOTE — OP NOTE
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]     * Combined forms of age-related cataract of both eyes [H25.813]     Procedures  Extraction Cataract with Insertion Intraocular Lens  23421 - TN XCAPSL CTRC RMVL INSJ IO LENS PROSTH W/O ECP    Goniotomy  74176 - TN GONIOTOMY    REVISION, AQUEOUS SHUNT, EYE  55722 - TN REVJ SHUNT EXTRAOCULAR RESERVOIR W/O GRAFT  , LEFT    Surgeons   Panel 1:     * Daina Sozeri - Primary  Panel 2:     * Daina Sozeri - Primary  Panel 3:     * Daina Sozeri - Primary    Resident/Fellow/Other Assistant:  Paulette    Procedure Summary  Anesthesia: Monitor Anesthesia Care  ASA: II  Anesthesia Staff:   Anesthesiologist: Manas Chin MD  C-AA: GORDO Christensen  Estimated Blood Loss: none        Specimen: No specimens collected     Staff:   Circulator: Annamaria Nicolas RN  Scrub Person: Inna Hubbard; Kristin Martinez RN          Findings: as expected    Indications: Clinton Camp is an 30 y.o. 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 LEFT    The patient was seen in the preoperative area. The risks, benefits, complications, treatment options, non-operative alternatives, expected recovery and outcomes were discussed with the patient. The possibilities of reaction to medication, pulmonary aspiration, injury to surrounding structures, bleeding, recurrent infection, the need for additional procedures, failure to diagnose a condition, and creating a complication or operation were discussed with the patient. The patient concurred with the proposed plan, giving informed consent.  The site of surgery was properly noted/marked if  necessary per policy.     Procedure Details:   The patient was escorted to the operating room where a time out was completed   and monitored anesthesia care was begun. The patient was prepped and draped in   the usual sterile fashion for intraocular surgery. A lid speculum was placed   and the operating microscope was positioned. A paracentesis was made to the   left of the planned cataract incision with a 1mm blade.  Shugarcaine followed by Viscoat was used to   replace the aqueous humor. A temporal clear corneal wound was made with a 2.4   mm keratome, extending 2 mm into clear cornea before entering the anterior   chamber. Posterior synechiea were lysed with the visco canula. Due to pupillary miosis a 6.25 mm malyugin ring was placed. Due to a poor red reflex trypan blue was used to paint the anterior capsule. A continuous curvilinear  capsulorhexis of approximately 5 mm in   diameter was performed. Hydrodissection was performed using a canula. The   pre-chopper was used to crack the nucleus into smaller pieces which were then   removed with the assistance of a horizontal chopper and phaco hand piece.   Residual cortex was removed with IA. ProVisc was used to inflate the capsular   bag. The lens implant  agusto CNAUTO 24.5 diopter intraocular lens. The lens was   inserted into the capsular bag and rotated to the proper position with a   vicente. The malyugin ring was removed. The patient was then positioned for direct gonioscopy. A hour 4 clock hour nasal goniotomy was made.   The patient was repositioned supine. The superior and nasal aspects of the plate were needled to allow for increased flow through her tube shunt. A bss canula was used to canulate the tube ab interno and it was flushed to allow for more flow though the tube.  Residual   viscoelastic was removed from the eye with the irrigation/aspiration   instrument. The wounds were checked and found to be watertight. The anterior   chamber was at  physiologic depth and pressure. The lid speculum was removed and   a patch and shield were taped in place. The patient tolerated the procedure   well, and there were no complications.    Complications:  None   Disposition: stable          Attending Attestation: I was present and scrubbed for the entire procedure    Daina Wheeler  Phone Number: [phone]

## 2025-05-14 ENCOUNTER — APPOINTMENT (OUTPATIENT)
Dept: OPHTHALMOLOGY | Facility: CLINIC | Age: 30
End: 2025-05-14
Payer: COMMERCIAL

## 2025-05-14 DIAGNOSIS — Q15.0 JUVENILE GLAUCOMA: ICD-10-CM

## 2025-05-14 PROCEDURE — 99024 POSTOP FOLLOW-UP VISIT: CPT | Performed by: OPHTHALMOLOGY

## 2025-05-14 RX ORDER — LATANOPROST 50 UG/ML
1 SOLUTION/ DROPS OPHTHALMIC NIGHTLY
Qty: 7.5 ML | Refills: 3 | Status: SHIPPED | OUTPATIENT
Start: 2025-05-14 | End: 2026-05-14

## 2025-05-14 ASSESSMENT — TONOMETRY
IOP_METHOD: GOLDMANN APPLANATION
OS_IOP_MMHG: 26

## 2025-05-14 ASSESSMENT — ENCOUNTER SYMPTOMS
RESPIRATORY NEGATIVE: 0
CONSTITUTIONAL NEGATIVE: 0
HEMATOLOGIC/LYMPHATIC NEGATIVE: 0
CARDIOVASCULAR NEGATIVE: 0
NEUROLOGICAL NEGATIVE: 0
PSYCHIATRIC NEGATIVE: 0
ENDOCRINE NEGATIVE: 0
GASTROINTESTINAL NEGATIVE: 0
EYES NEGATIVE: 1
ALLERGIC/IMMUNOLOGIC NEGATIVE: 0
MUSCULOSKELETAL NEGATIVE: 0

## 2025-05-14 ASSESSMENT — PAIN SCALES - GENERAL: PAINLEVEL_OUTOF10: 0 - NO PAIN

## 2025-05-14 ASSESSMENT — VISUAL ACUITY
METHOD: SNELLEN - LINEAR
OS_SC: 20/200

## 2025-05-14 ASSESSMENT — SLIT LAMP EXAM - LIDS
COMMENTS: GOOD POSITION
COMMENTS: GOOD POSITION

## 2025-05-14 ASSESSMENT — EXTERNAL EXAM - RIGHT EYE: OD_EXAM: NORMAL

## 2025-05-14 ASSESSMENT — PACHYMETRY
OD_CT(UM): 584
OS_CT(UM): 596

## 2025-05-14 ASSESSMENT — EXTERNAL EXAM - LEFT EYE: OS_EXAM: NORMAL

## 2025-05-14 NOTE — PROGRESS NOTES
Juvenile glaucoma OU, severe OU : CCT: 584/596. s/p TUBE shunts OU with Dr. 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     Notes  Severe defect Os, stable from previous             IOP is up OS patient remains non-compliant with drops      Continue dorzol-timolol BID OU    continue latan qhs OU    Continue brimonidine  BID ou    Given below will proceed with ce/iol/goniotomy/tube needling OS      Clinton Camp 30 y.o. presents with visually significant cataract of left eye(s). The cataract(s) is/are affecting activities of daily living including reading and watching tv. It is believed removal of the cataract will improve vision and thus quality of life. After discussing r/b/a to surgery the patient elected to proceedleft eye only. Lens options were discussed including pros/cons/and eligibility for monofocal, toric, multifocal, and toric multifocal lenses and patient elected to proceed with monofocal. patient's current mrx is plano. target after surgery will be plano. patient has hx of severe glaucoma that my effect surgery or post surgical visual outcome.  The patient was told to continue all medications through surgery. anesthesia will planned to be mac with subtenons block. Will combine with above to help with IOP     POD1 status post (s/p) OS ce/iol/gonitoomy/shunt revision (adhesion lysing)   Resume cosopt OS  Pred and moxi qid  Continue all glaucoma drops OD  Shield/activity restrictions  RTC 1 week

## 2025-05-20 ENCOUNTER — APPOINTMENT (OUTPATIENT)
Dept: OBSTETRICS AND GYNECOLOGY | Facility: CLINIC | Age: 30
End: 2025-05-20
Payer: COMMERCIAL

## 2025-05-21 ENCOUNTER — APPOINTMENT (OUTPATIENT)
Dept: OPHTHALMOLOGY | Facility: CLINIC | Age: 30
End: 2025-05-21
Payer: COMMERCIAL

## 2025-05-21 DIAGNOSIS — Q15.0 JUVENILE GLAUCOMA: Primary | ICD-10-CM

## 2025-05-21 DIAGNOSIS — Z96.1 PSEUDOPHAKIA: ICD-10-CM

## 2025-05-21 PROCEDURE — 99024 POSTOP FOLLOW-UP VISIT: CPT | Performed by: OPHTHALMOLOGY

## 2025-05-21 ASSESSMENT — ENCOUNTER SYMPTOMS
NEUROLOGICAL NEGATIVE: 0
ENDOCRINE NEGATIVE: 0
RESPIRATORY NEGATIVE: 0
MUSCULOSKELETAL NEGATIVE: 0
HEMATOLOGIC/LYMPHATIC NEGATIVE: 0
PSYCHIATRIC NEGATIVE: 0
CONSTITUTIONAL NEGATIVE: 0
GASTROINTESTINAL NEGATIVE: 0
CARDIOVASCULAR NEGATIVE: 0
EYES NEGATIVE: 1
ALLERGIC/IMMUNOLOGIC NEGATIVE: 0

## 2025-05-21 ASSESSMENT — VISUAL ACUITY
OS_PH_SC+: +2
OS_SC+: -2
OS_SC: 20/200
METHOD: SNELLEN - LINEAR
OS_PH_SC: 20/200

## 2025-05-21 ASSESSMENT — TONOMETRY
IOP_METHOD: GOLDMANN APPLANATION
OS_IOP_MMHG: 10

## 2025-05-21 ASSESSMENT — PACHYMETRY
OD_CT(UM): 584
OS_CT(UM): 596

## 2025-05-21 ASSESSMENT — SLIT LAMP EXAM - LIDS
COMMENTS: GOOD POSITION
COMMENTS: GOOD POSITION

## 2025-05-21 ASSESSMENT — EXTERNAL EXAM - LEFT EYE: OS_EXAM: NORMAL

## 2025-05-21 ASSESSMENT — EXTERNAL EXAM - RIGHT EYE: OD_EXAM: NORMAL

## 2025-05-21 NOTE — PROGRESS NOTES
Juvenile glaucoma OU, severe OU : CCT: 584/596. s/p TUBE shunts OU with Dr. 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     Notes  Severe defect Os, stable from previous             IOP is up OS patient remains non-compliant with drops      Continue dorzol-timolol BID OU    continue latan qhs OU    Continue brimonidine  BID ou    Given below will proceed with ce/iol/goniotomy/tube needling OS      Clinton Camp 30 y.o. presents with visually significant cataract of left eye(s). The cataract(s) is/are affecting activities of daily living including reading and watching tv. It is believed removal of the cataract will improve vision and thus quality of life. After discussing r/b/a to surgery the patient elected to proceedleft eye only. Lens options were discussed including pros/cons/and eligibility for monofocal, toric, multifocal, and toric multifocal lenses and patient elected to proceed with monofocal. patient's current mrx is plano. target after surgery will be plano. patient has hx of severe glaucoma that my effect surgery or post surgical visual outcome.  The patient was told to continue all medications through surgery. anesthesia will planned to be mac with subtenons block. Will combine with above to help with IOP     POw1 status post (s/p) OS ce/iol/gonitoomy/shunt revision (adhesion lysing)   continue cosopt OS  Taper Pred   Stop moxi qid  Continue all glaucoma drops OD  Rtc 3-4 weeks for mrx/iop check

## 2025-05-27 ENCOUNTER — APPOINTMENT (OUTPATIENT)
Facility: CLINIC | Age: 30
End: 2025-05-27
Payer: COMMERCIAL

## 2025-05-27 PROBLEM — Z30.09 GENERAL COUNSELING AND ADVICE ON FEMALE CONTRACEPTION: Status: RESOLVED | Noted: 2024-11-22 | Resolved: 2025-05-27

## 2025-05-27 PROBLEM — Q15.0 JUVENILE GLAUCOMA: Status: RESOLVED | Noted: 2025-04-17 | Resolved: 2025-05-27

## 2025-05-27 PROBLEM — H25.813 COMBINED FORMS OF AGE-RELATED CATARACT OF BOTH EYES: Status: RESOLVED | Noted: 2025-04-17 | Resolved: 2025-05-27

## 2025-05-27 PROBLEM — N93.0 PCB (POST COITAL BLEEDING): Status: RESOLVED | Noted: 2024-11-22 | Resolved: 2025-05-27

## 2025-05-30 ENCOUNTER — OFFICE VISIT (OUTPATIENT)
Dept: OBSTETRICS AND GYNECOLOGY | Facility: CLINIC | Age: 30
End: 2025-05-30
Payer: COMMERCIAL

## 2025-05-30 VITALS
SYSTOLIC BLOOD PRESSURE: 117 MMHG | DIASTOLIC BLOOD PRESSURE: 86 MMHG | HEART RATE: 94 BPM | WEIGHT: 131.6 LBS | BODY MASS INDEX: 29.5 KG/M2

## 2025-05-30 DIAGNOSIS — Z71.6 ENCOUNTER FOR SMOKING CESSATION COUNSELING: ICD-10-CM

## 2025-05-30 DIAGNOSIS — N64.4 BREAST TENDERNESS IN FEMALE: ICD-10-CM

## 2025-05-30 DIAGNOSIS — Z00.00 WELL FEMALE EXAM WITHOUT GYNECOLOGICAL EXAM: Primary | ICD-10-CM

## 2025-05-30 DIAGNOSIS — Z30.09 BIRTH CONTROL COUNSELING: ICD-10-CM

## 2025-05-30 DIAGNOSIS — N93.9 ABNORMAL UTERINE BLEEDING (AUB): ICD-10-CM

## 2025-05-30 PROBLEM — H40.9 GLAUCOMA: Status: ACTIVE | Noted: 2025-05-30

## 2025-05-30 PROCEDURE — 99395 PREV VISIT EST AGE 18-39: CPT | Performed by: ADVANCED PRACTICE MIDWIFE

## 2025-05-30 PROCEDURE — RXMED WILLOW AMBULATORY MEDICATION CHARGE

## 2025-05-30 RX ORDER — IBUPROFEN 200 MG
1 TABLET ORAL EVERY 24 HOURS
Qty: 28 PATCH | Refills: 11 | Status: SHIPPED | OUTPATIENT
Start: 2025-05-30 | End: 2025-06-29

## 2025-05-30 RX ORDER — DROSPIRENONE 4 MG/1
4 TABLET, FILM COATED ORAL DAILY
Qty: 28 TABLET | Refills: 11 | Status: SHIPPED | OUTPATIENT
Start: 2025-05-30

## 2025-05-30 NOTE — PROGRESS NOTES
Subjective   Clinton Camp is a 30 y.o. female who is here for an annual gyn exam.   Concerns: irregular menstrual cycles. Patient stated that she usually gets her period every 2-3 months. This current time she went 5 months without her period which caused her some concern so she made an appointment. Patient would like birth control to help regulate her period.     Patient also reporting left breast tenderness x1 week when her period started.     Patient smokes 0.25 packs of cigarettes and would like help with cessation.     Patient's last menstrual period was 2025 (exact date).  Periods are irregular, lasting 30 days.   Dysmenorrhea:none.   Cyclic symptoms include none. No intermenstrual bleeding, spotting, or discharge.    Last pap: 2023 WNL   History of abnormal Pap smear: no  STI testing: no  HPV vaccination: Yes x3 3/13/2009, 2009, 2010    IPV screen:  Have you ever experienced any form of emotional, physical, sexual or financial abuse? no  Have you ever been hit, pushed, bitten, kicked, choked or grabbed by your partner or an ex-partner? no  Do you ever feel scared or threatened by your partner or ex-partner? no    SoHx:  Vaginal hygiene: water ad soap  Loss of sexual desire: Yes  Pain with sex: No  Able to orgasm: Yes   Living Situation: [facility]   School/Employment: [employer]   Exercise: Yes     Substance:   Tobacco Use: High Risk (2025)    Patient History     Smoking Tobacco Use: Every Day     Smokeless Tobacco Use: Never     Passive Exposure: Not on file      Social History     Substance and Sexual Activity   Drug Use Not Currently    Types: Marijuana      Social History     Substance and Sexual Activity   Alcohol Use Not Currently       Social History     Substance and Sexual Activity   Sexual Activity Yes    Partners: Male    Birth control/protection: None     OB History          0    Para   0    Term   0       0    AB   0    Living   0         SAB   0     IAB   0    Ectopic   0    Multiple   0    Live Births   0               Medical History[1]  Surgical History[2]  Family History[3]    Review of Systems   Genitourinary:  Positive for menstrual problem.   All other systems reviewed and are negative.      Objective   /86   Pulse 94   Wt 59.7 kg (131 lb 9.6 oz)   LMP 05/23/2025 (Exact Date)   BMI 29.50 kg/m²     Physical Exam  Constitutional:       Appearance: Normal appearance.   HENT:      Head: Normocephalic.   Cardiovascular:      Rate and Rhythm: Normal rate.   Pulmonary:      Effort: Pulmonary effort is normal.   Chest:   Breasts:     Right: Normal.      Left: Normal.          Comments: Very minimal tenderness to deep palpation; no lumps/bumps palpated, skin appeared normal looking  Skin:     General: Skin is warm and dry.   Neurological:      Mental Status: She is alert.   Psychiatric:         Mood and Affect: Mood normal.         Behavior: Behavior normal.         Thought Content: Thought content normal.         Judgment: Judgment normal.         Assessment/Plan   Problem List Items Addressed This Visit       Abnormal uterine bleeding (AUB)    Overview   -patient reports hx of irregular menstrual cycles  -Patient stated that she usually gets her period every 2-3 months  -This current time she went 5 months without her period, though period started 5/23/25  -on chart review 2 elevated Bps noted in 7/17/2023 and 2/7/2022; given that POPs prescribed  Discussed with patient about how to take POPs and what to do if any pills are forgotten  -RTC in 2 months for birth control assessment   -consider PCOS labs if AUB continues          Breast tenderness in female    Overview   -WNL breast exam today   -discussed pain might be related to period just starting  -encouraged patient to continue to monitor symptoms   -RTC in 2 months for birth control/breast assessment           Other Visit Diagnoses         Well female exam without gynecological exam    -  Primary       Birth control counseling        Relevant Medications    drospirenone, contraceptive, (Slynd) 4 mg (28) tablet      Encounter for smoking cessation counseling        Relevant Medications    nicotine (Nicoderm CQ) 14 mg/24 hr patch            Plan:   -discussed about breast self awareness  -encouraged healthy eating, exercise recommendations based of off  min of moderate intensity exercise a week  -RTC in 1  year for next annual or prn    Sandous Najjar, PA-S    I was present with the PA student who participated in the documentation of this note. I have personally seen and re-examined the patient and performed the medical decision-making components (assessment and plan of care). I have reviewed the PA student documentation and verified the findings in the note as written with additions or exceptions as stated in the body of this note.    BRITNEY Waite-CARLOS ALBERTO           [1]   Past Medical History:  Diagnosis Date    Hx of chlamydia infection 09/03/2017    Hx of gonorrhea     Hx of trichomoniasis     Hypertension     ?    Juvenile glaucoma 04/17/2025    Primary open-angle glaucoma, bilateral, severe stage 03/17/2018    Primary open-angle glaucoma, bilateral, severe stage    Primary open-angle glaucoma, unspecified eye, stage unspecified 03/17/2018    Primary open-angle glaucoma, unspecified eye, stage unspecified    Unspecified acute and subacute iridocyclitis 07/11/2018    Acute iritis of left eye    Unspecified episcleritis, right eye 08/07/2019    Episcleritis of right eye    Visual discomfort, bilateral 08/07/2019    Photophobia of both eyes   [2]   Past Surgical History:  Procedure Laterality Date    CATARACT EXTRACTION     [3]   Family History  Problem Relation Name Age of Onset    Hypertension Mother      Breast cancer Paternal Grandmother      Ovarian cancer Neg Hx      Colon cancer Neg Hx      Pancreatic cancer Neg Hx

## 2025-06-03 ENCOUNTER — PHARMACY VISIT (OUTPATIENT)
Dept: PHARMACY | Facility: CLINIC | Age: 30
End: 2025-06-03
Payer: MEDICAID

## 2025-06-05 ENCOUNTER — OFFICE VISIT (OUTPATIENT)
Dept: OPHTHALMOLOGY | Facility: CLINIC | Age: 30
End: 2025-06-05
Payer: COMMERCIAL

## 2025-06-05 DIAGNOSIS — H44.40 HYPOTONY OF EYE: Primary | ICD-10-CM

## 2025-06-05 DIAGNOSIS — Q15.0 JUVENILE GLAUCOMA: ICD-10-CM

## 2025-06-05 PROCEDURE — 99214 OFFICE O/P EST MOD 30 MIN: CPT | Performed by: OPTOMETRIST

## 2025-06-05 PROCEDURE — 92134 CPTRZ OPH DX IMG PST SGM RTA: CPT | Performed by: OPTOMETRIST

## 2025-06-05 RX ORDER — LATANOPROST 50 UG/ML
1 SOLUTION/ DROPS OPHTHALMIC NIGHTLY
Qty: 7.5 ML | Refills: 3 | Status: SHIPPED | OUTPATIENT
Start: 2025-06-05 | End: 2026-06-05

## 2025-06-05 RX ORDER — ATROPINE SULFATE 10 MG/ML
1 SOLUTION/ DROPS OPHTHALMIC 2 TIMES DAILY
Qty: 5 ML | Refills: 1 | Status: SHIPPED | OUTPATIENT
Start: 2025-06-05 | End: 2026-06-05

## 2025-06-05 ASSESSMENT — CUP TO DISC RATIO
OD_RATIO: .99
OS_RATIO: .8

## 2025-06-05 ASSESSMENT — TONOMETRY
OS_IOP_MMHG: 4
IOP_METHOD: GOLDMANN APPLANATION
OD_IOP_MMHG: 17

## 2025-06-05 ASSESSMENT — CONF VISUAL FIELD
OS_SUPERIOR_TEMPORAL_RESTRICTION: 1
OS_SUPERIOR_NASAL_RESTRICTION: 1
OD_INFERIOR_TEMPORAL_RESTRICTION: 3
OD_INFERIOR_NASAL_RESTRICTION: 3
OS_INFERIOR_TEMPORAL_RESTRICTION: 1
OD_SUPERIOR_NASAL_RESTRICTION: 3
OD_SUPERIOR_TEMPORAL_RESTRICTION: 3
OS_INFERIOR_NASAL_RESTRICTION: 1

## 2025-06-05 ASSESSMENT — VISUAL ACUITY
METHOD: SNELLEN - LINEAR
OS_SC: 20/200
OS_PH_SC: PHNI
OD_SC: LP

## 2025-06-05 ASSESSMENT — SLIT LAMP EXAM - LIDS
COMMENTS: GOOD POSITION
COMMENTS: GOOD POSITION

## 2025-06-05 ASSESSMENT — EXTERNAL EXAM - LEFT EYE: OS_EXAM: NORMAL

## 2025-06-05 ASSESSMENT — EXTERNAL EXAM - RIGHT EYE: OD_EXAM: NORMAL

## 2025-06-05 NOTE — PROGRESS NOTES
Will start Atropine BID OS, called patient and let her know instructions and sent to her pharmacy.

## 2025-06-05 NOTE — PROGRESS NOTES
Assessment/Plan   Diagnoses and all orders for this visit:  Hypotony of eye  Juvenile glaucoma  -     latanoprost (Xalatan) 0.005 % ophthalmic solution; Administer 1 drop into both eyes once daily at bedtime.  -     OCT, Retina - OU - Both Eyes  -     Fundus Photos - OU - Both Eyes  s/p ce/iol/goniotomy/shunt revision (adhesion lysing) OS 5/13/25 w Dr. Wheeler   Currently on cosopt BID and brimonidine BID   Off pred and ofloxacin   Today presenting after trauma to left eye 1 week ago, vision clear OS but w gray film over it   VA stable LP OD and 20/200 OS compared to 5/21, IOP 17/4.   Exam notable for William negative elevated bleb ST, as well as William negative wounds. Anterior chamber (AC) is very shallow with tube abutting iris pointing posteriorly at 1:00, with displacement of iris at 1:00 2/2 tube. No anterior chamber (AC) cell or hypopyon. Posterior chamber intraocular lens (PCIOL) centered.   DFE notable for choroidal effusions nasally and temporally (kissing choroidals).   OCT Mac with significant macular edema vs shallow effusion.   Recommend stopping all glaucoma drops OS for now.   Keep shield over OS at all times   [facility] morning 10:00 AM for IOP and anterior chamber (AC) check. [phone]

## 2025-06-06 ENCOUNTER — PHARMACY VISIT (OUTPATIENT)
Dept: PHARMACY | Facility: CLINIC | Age: 30
End: 2025-06-06
Payer: MEDICAID

## 2025-06-06 ENCOUNTER — DOCUMENTATION (OUTPATIENT)
Dept: OPHTHALMOLOGY | Facility: CLINIC | Age: 30
End: 2025-06-06
Payer: COMMERCIAL

## 2025-06-06 DIAGNOSIS — Z98.42 CATARACT EXTRACTION STATUS OF LEFT EYE: ICD-10-CM

## 2025-06-06 PROCEDURE — RXMED WILLOW AMBULATORY MEDICATION CHARGE

## 2025-06-06 RX ORDER — PREDNISOLONE ACETATE 10 MG/ML
1 SUSPENSION/ DROPS OPHTHALMIC 4 TIMES DAILY
Qty: 5 ML | Refills: 0 | Status: SHIPPED | OUTPATIENT
Start: 2025-06-06 | End: 2025-07-06

## 2025-06-06 NOTE — PROGRESS NOTES
Patient seen and evaluated in [facility] 6/6/2025. She has not started her new drop regimen (atroping qhs OS, latanoprost qhs OD) because she was not able to pick it up at the pharmacy yesterday. Still endorsing significant tearing and some discomfort. Vision remains blurry.    VA: LP OD, 20/200 OS PHNI   IOP: OD deferred, OS 4 (applanation)  CVF: unable OD, constricted OS all quadrants     SLE OD AC tube in good position at 11 o/c no I/K touch  SLE OS superior 1 o/c bleb [id number]. Corneal wounds siedel negative. AC very shallow throughout, tube embedded in iris at 1 o/c with distortion, patent opening. PCIOL centered. Kissing choroidal effusions temporal/nasal    #Juvenile glaucoma OU  #Hypotony OS   s/p ce/iol/goniotomy/shunt revision (adhesion lysing) OS 5/13/25 w Dr. Wheeler   Continue holding cosopt/brim  Off pred and ofloxacin     Today presenting for follow up after trauma to left eye 1 week ago, vision clear OS but w gray film over it   VA stable LP OD and 20/200 OS compared to 5/21, IOP OS remains 4   Exam notable for William negative elevated bleb ST, as well as William negative wounds. Anterior chamber (AC) is very shallow with tube abutting iris pointing posteriorly at 1:00, with displacement of iris at 1:00 2/2 tube. No anterior chamber (AC) cell or hypopyon. Posterior chamber intraocular lens (PCIOL) centered.     DFE notable for choroidal effusions nasally and temporally (kissing choroidals).     Recommend holding all glaucoma drops OS for now.   Keep shield over OS at all times   Strict return precautions discussed with patient  Strict activity restrictions discussed with patient until follow up  Recommend picking up and starting atropine TID OS (patient has not started yet)  Recommend STARTING prednisolone acetate 1% QID OS  Recommend latanoprost qhs OD   Will discuss for potential for moving up follow up with glaucoma service to early next week.    Patient discussed with attending  physicians, Tere Celis and Lanie.    Phone number [phone] (boyfriend's #, Vincent)   [phone] (patient's)        Alexander Christian MD   Ophthalmology, PGY3    Ophthalmology Adult Pager - [phone]  Ophthalmology Pediatrics Pager - [phone]     For adult follow-up appointments, call: [phone]  For pediatric follow-up appointments, call: [phone]     Brief Key of Common Ophthalmology Abbreviations:  OD: right eye  OS: left eye  OU: both eyes  VA: visual acuity   IOP: intraocular pressure  EOMs: extraocular movements  CVF: confrontal visual fields  DFE: dilated fundus exam  DBH: dot blot hemorrhage  CWS: cotton wool spot  AC: anterior chamber  RD: retinal detachment  PVD: posterior vitreous detachment

## 2025-06-12 ENCOUNTER — PHARMACY VISIT (OUTPATIENT)
Dept: PHARMACY | Facility: CLINIC | Age: 30
End: 2025-06-12

## 2025-06-12 ENCOUNTER — APPOINTMENT (OUTPATIENT)
Dept: OPHTHALMOLOGY | Facility: CLINIC | Age: 30
End: 2025-06-12
Payer: COMMERCIAL

## 2025-06-13 ENCOUNTER — OFFICE VISIT (OUTPATIENT)
Dept: OPHTHALMOLOGY | Facility: CLINIC | Age: 30
End: 2025-06-13
Payer: COMMERCIAL

## 2025-06-13 DIAGNOSIS — Q15.0 JUVENILE GLAUCOMA: ICD-10-CM

## 2025-06-13 PROCEDURE — 99024 POSTOP FOLLOW-UP VISIT: CPT | Performed by: OPHTHALMOLOGY

## 2025-06-13 RX ORDER — LATANOPROST 50 UG/ML
1 SOLUTION/ DROPS OPHTHALMIC NIGHTLY
Qty: 7.5 ML | Refills: 3 | Status: SHIPPED | OUTPATIENT
Start: 2025-06-13 | End: 2026-06-13

## 2025-06-13 RX ORDER — BRIMONIDINE TARTRATE 2 MG/ML
1 SOLUTION/ DROPS OPHTHALMIC 2 TIMES DAILY
Qty: 30 ML | Refills: 3 | Status: SHIPPED | OUTPATIENT
Start: 2025-06-13 | End: 2026-06-13

## 2025-06-13 ASSESSMENT — ENCOUNTER SYMPTOMS
RESPIRATORY NEGATIVE: 0
ALLERGIC/IMMUNOLOGIC NEGATIVE: 0
NEUROLOGICAL NEGATIVE: 0
CONSTITUTIONAL NEGATIVE: 0
PSYCHIATRIC NEGATIVE: 0
GASTROINTESTINAL NEGATIVE: 0
MUSCULOSKELETAL NEGATIVE: 0
EYES NEGATIVE: 1
ENDOCRINE NEGATIVE: 0
CARDIOVASCULAR NEGATIVE: 0
HEMATOLOGIC/LYMPHATIC NEGATIVE: 0

## 2025-06-13 ASSESSMENT — VISUAL ACUITY
OD_SC: LP
OS_SC: 20/600
METHOD: SNELLEN - LINEAR

## 2025-06-13 ASSESSMENT — REFRACTION_MANIFEST
OS_SPHERE: +3.75
OS_CYLINDER: -0.50
OS_AXIS: 061

## 2025-06-13 ASSESSMENT — TONOMETRY
OD_IOP_MMHG: 16
IOP_METHOD: GOLDMANN APPLANATION
OS_IOP_MMHG: 6

## 2025-06-13 ASSESSMENT — PACHYMETRY
OD_CT(UM): 584
OS_CT(UM): 596

## 2025-06-13 ASSESSMENT — EXTERNAL EXAM - RIGHT EYE: OD_EXAM: NORMAL

## 2025-06-13 ASSESSMENT — CUP TO DISC RATIO
OS_RATIO: .8
OD_RATIO: .99

## 2025-06-13 ASSESSMENT — SLIT LAMP EXAM - LIDS
COMMENTS: GOOD POSITION
COMMENTS: GOOD POSITION

## 2025-06-13 ASSESSMENT — EXTERNAL EXAM - LEFT EYE: OS_EXAM: NORMAL

## 2025-06-13 NOTE — PROGRESS NOTES
Juvenile glaucoma OU, severe OU : CCT: 584/596. s/p TUBE shunts OU with Dr. 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     Notes  Severe defect Os, stable from previous             IOP is up OS patient remains non-compliant with drops      Continue dorzol-timolol BID OU    continue latan qhs OU    Continue brimonidine  BID ou    Given below will proceed with ce/iol/goniotomy/tube needling OS      Clinton Camp 30 y.o. presents with visually significant cataract of left eye(s). The cataract(s) is/are affecting activities of daily living including reading and watching tv. It is believed removal of the cataract will improve vision and thus quality of life. After discussing r/b/a to surgery the patient elected to proceedleft eye only. Lens options were discussed including pros/cons/and eligibility for monofocal, toric, multifocal, and toric multifocal lenses and patient elected to proceed with monofocal. patient's current mrx is plano. target after surgery will be plano. patient has hx of severe glaucoma that my effect surgery or post surgical visual outcome.  The patient was told to continue all medications through surgery. anesthesia will planned to be mac with subtenons block. Will combine with above to help with IOP     POw1 status post (s/p) OS ce/iol/gonitoomy/shunt revision (adhesion lysing)   Post operative trauma (hit eye on door 1.5 weeks post op)  Now with hypotony and choroidals  Increase  Pred to q2h  Atropine bid OS  No glaucoma drops os  Continue all glaucoma drops OD  Written instructions given  Rtc 1 week for IOP check

## 2025-06-19 ENCOUNTER — APPOINTMENT (OUTPATIENT)
Dept: OPHTHALMOLOGY | Facility: CLINIC | Age: 30
End: 2025-06-19
Payer: COMMERCIAL

## 2025-06-24 ENCOUNTER — APPOINTMENT (OUTPATIENT)
Dept: OPHTHALMOLOGY | Facility: CLINIC | Age: 30
End: 2025-06-24
Payer: COMMERCIAL

## 2025-06-24 DIAGNOSIS — Q15.0 JUVENILE GLAUCOMA: Primary | ICD-10-CM

## 2025-06-24 PROCEDURE — RXMED WILLOW AMBULATORY MEDICATION CHARGE

## 2025-06-24 PROCEDURE — 99024 POSTOP FOLLOW-UP VISIT: CPT | Performed by: OPHTHALMOLOGY

## 2025-06-24 RX ORDER — PREDNISONE 10 MG/1
40 TABLET ORAL DAILY
Qty: 40 TABLET | Refills: 0 | Status: SHIPPED | OUTPATIENT
Start: 2025-06-24 | End: 2025-07-06

## 2025-06-24 ASSESSMENT — TONOMETRY
OS_IOP_MMHG: 5
IOP_METHOD: GOLDMANN APPLANATION
OD_IOP_MMHG: 26

## 2025-06-24 ASSESSMENT — ENCOUNTER SYMPTOMS
GASTROINTESTINAL NEGATIVE: 0
RESPIRATORY NEGATIVE: 0
ALLERGIC/IMMUNOLOGIC NEGATIVE: 0
NEUROLOGICAL NEGATIVE: 0
CONSTITUTIONAL NEGATIVE: 0
PSYCHIATRIC NEGATIVE: 0
CARDIOVASCULAR NEGATIVE: 0
HEMATOLOGIC/LYMPHATIC NEGATIVE: 0
MUSCULOSKELETAL NEGATIVE: 0
EYES NEGATIVE: 1
ENDOCRINE NEGATIVE: 0

## 2025-06-24 ASSESSMENT — VISUAL ACUITY
OS_SC: 20/200
METHOD: SNELLEN - LINEAR
OD_SC: LP
OS_PH_SC: 20/NI

## 2025-06-24 ASSESSMENT — SLIT LAMP EXAM - LIDS
COMMENTS: GOOD POSITION
COMMENTS: GOOD POSITION

## 2025-06-24 ASSESSMENT — EXTERNAL EXAM - RIGHT EYE: OD_EXAM: NORMAL

## 2025-06-24 ASSESSMENT — CUP TO DISC RATIO
OS_RATIO: .8
OD_RATIO: .99

## 2025-06-24 ASSESSMENT — PACHYMETRY
OS_CT(UM): 596
OD_CT(UM): 584

## 2025-06-24 ASSESSMENT — EXTERNAL EXAM - LEFT EYE: OS_EXAM: NORMAL

## 2025-06-24 NOTE — PROGRESS NOTES
Juvenile glaucoma OU, severe OU : CCT: 584/596. s/p TUBE shunts OU with Dr. 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     Notes  Severe defect Os, stable from previous             IOP is up OS patient remains non-compliant with drops      Continue dorzol-timolol BID OU    continue latan qhs OU    Continue brimonidine  BID ou    Given below will proceed with ce/iol/goniotomy/tube needling OS      Clinton Camp 30 y.o. presents with visually significant cataract of left eye(s). The cataract(s) is/are affecting activities of daily living including reading and watching tv. It is believed removal of the cataract will improve vision and thus quality of life. After discussing r/b/a to surgery the patient elected to proceedleft eye only. Lens options were discussed including pros/cons/and eligibility for monofocal, toric, multifocal, and toric multifocal lenses and patient elected to proceed with monofocal. patient's current mrx is plano. target after surgery will be plano. patient has hx of severe glaucoma that my effect surgery or post surgical visual outcome.  The patient was told to continue all medications through surgery. anesthesia will planned to be mac with subtenons block. Will combine with above to help with IOP     POw4 status post (s/p) OS ce/iol/gonitoomy/shunt revision (adhesion lysing)   Post operative trauma (hit eye on door 1.5 weeks post op)  Now with hypotony and choroidals, choroidals improving  continue Pred to q2h  Start oral pred 40 mg pO x 10 days  Atropine bid OS  No glaucoma drops os  Continue all glaucoma drops OD  Written instructions given  Rtc 2-3 weeks for IOP check

## 2025-06-25 DIAGNOSIS — Z98.42 CATARACT EXTRACTION STATUS OF LEFT EYE: ICD-10-CM

## 2025-06-25 PROCEDURE — RXMED WILLOW AMBULATORY MEDICATION CHARGE

## 2025-06-25 RX ORDER — PREDNISOLONE ACETATE 10 MG/ML
1 SUSPENSION/ DROPS OPHTHALMIC 4 TIMES DAILY
Qty: 15 ML | Refills: 1 | Status: SHIPPED | OUTPATIENT
Start: 2025-06-25 | End: 2025-07-25

## 2025-06-26 ENCOUNTER — PHARMACY VISIT (OUTPATIENT)
Dept: PHARMACY | Facility: CLINIC | Age: 30
End: 2025-06-26
Payer: MEDICAID

## 2025-07-07 PROCEDURE — RXMED WILLOW AMBULATORY MEDICATION CHARGE

## 2025-07-11 ENCOUNTER — APPOINTMENT (OUTPATIENT)
Dept: OPHTHALMOLOGY | Facility: CLINIC | Age: 30
End: 2025-07-11
Payer: COMMERCIAL

## 2025-07-11 DIAGNOSIS — Z98.42 CATARACT EXTRACTION STATUS OF LEFT EYE: ICD-10-CM

## 2025-07-11 DIAGNOSIS — H44.40 HYPOTONY OF EYE: ICD-10-CM

## 2025-07-11 RX ORDER — ATROPINE SULFATE 10 MG/ML
1 SOLUTION/ DROPS OPHTHALMIC 2 TIMES DAILY
Qty: 5 ML | Refills: 1 | Status: SHIPPED | OUTPATIENT
Start: 2025-07-11 | End: 2026-07-11

## 2025-07-11 RX ORDER — PREDNISOLONE ACETATE 10 MG/ML
1 SUSPENSION/ DROPS OPHTHALMIC
Qty: 15 ML | Refills: 1 | Status: SHIPPED | OUTPATIENT
Start: 2025-07-11

## 2025-07-15 ENCOUNTER — PHARMACY VISIT (OUTPATIENT)
Dept: PHARMACY | Facility: CLINIC | Age: 30
End: 2025-07-15
Payer: MEDICAID

## 2025-07-15 PROCEDURE — RXMED WILLOW AMBULATORY MEDICATION CHARGE

## 2025-07-17 ENCOUNTER — APPOINTMENT (OUTPATIENT)
Dept: OPHTHALMOLOGY | Facility: CLINIC | Age: 30
End: 2025-07-17
Payer: COMMERCIAL

## 2025-07-17 DIAGNOSIS — H44.40 HYPOTONY OF EYE: ICD-10-CM

## 2025-07-17 DIAGNOSIS — Z98.42 CATARACT EXTRACTION STATUS OF LEFT EYE: Primary | ICD-10-CM

## 2025-07-17 PROCEDURE — 99024 POSTOP FOLLOW-UP VISIT: CPT | Performed by: OPHTHALMOLOGY

## 2025-07-17 RX ORDER — ATROPINE SULFATE 10 MG/ML
1 SOLUTION/ DROPS OPHTHALMIC
Qty: 10 ML | Refills: 1 | Status: SHIPPED | OUTPATIENT
Start: 2025-07-17 | End: 2026-07-17

## 2025-07-17 ASSESSMENT — TONOMETRY
OD_IOP_MMHG: 23
IOP_METHOD: GOLDMANN APPLANATION
OS_IOP_MMHG: 6

## 2025-07-17 ASSESSMENT — SLIT LAMP EXAM - LIDS
COMMENTS: GOOD POSITION
COMMENTS: GOOD POSITION

## 2025-07-17 ASSESSMENT — CUP TO DISC RATIO
OS_RATIO: .8
OD_RATIO: .99

## 2025-07-17 ASSESSMENT — VISUAL ACUITY
METHOD: SNELLEN - LINEAR
OS_PH_SC: 20/200
OS_SC: 20/400

## 2025-07-17 ASSESSMENT — EXTERNAL EXAM - RIGHT EYE: OD_EXAM: NORMAL

## 2025-07-17 ASSESSMENT — PACHYMETRY
OS_CT(UM): 596
OD_CT(UM): 584

## 2025-07-17 ASSESSMENT — EXTERNAL EXAM - LEFT EYE: OS_EXAM: NORMAL

## 2025-07-17 NOTE — PROGRESS NOTES
Juvenile glaucoma OU, severe OU : CCT: 584/596. s/p TUBE shunts OU with Dr. 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     Notes  Severe defect Os, stable from previous             IOP is up OS patient remains non-compliant with drops      Continue dorzol-timolol BID OU    continue latan qhs OU    Continue brimonidine  BID ou    Given below will proceed with ce/iol/goniotomy/tube needling OS      Clinton Camp 30 y.o. presents with visually significant cataract of left eye(s). The cataract(s) is/are affecting activities of daily living including reading and watching tv. It is believed removal of the cataract will improve vision and thus quality of life. After discussing r/b/a to surgery the patient elected to proceedleft eye only. Lens options were discussed including pros/cons/and eligibility for monofocal, toric, multifocal, and toric multifocal lenses and patient elected to proceed with monofocal. patient's current mrx is plano. target after surgery will be plano. patient has hx of severe glaucoma that my effect surgery or post surgical visual outcome.  The patient was told to continue all medications through surgery. anesthesia will planned to be mac with subtenons block. Will combine with above to help with IOP     POw8 status post (s/p) OS ce/iol/gonitoomy/shunt revision (adhesion lysing)   Post operative trauma (hit eye on door 1.5 weeks post op)  Resultant hypotony and choroidals, now nearly resolved  decrease Pred to qid  decrease Atropine to qday  No glaucoma drops os  Continue all glaucoma drops OD  Written instructions given  Rtc 3 weeks for mrx/oct mac

## 2025-07-29 PROCEDURE — RXMED WILLOW AMBULATORY MEDICATION CHARGE

## 2025-07-30 ENCOUNTER — APPOINTMENT (OUTPATIENT)
Dept: OPHTHALMOLOGY | Facility: CLINIC | Age: 30
End: 2025-07-30
Payer: COMMERCIAL

## 2025-07-30 ENCOUNTER — PHARMACY VISIT (OUTPATIENT)
Dept: PHARMACY | Facility: CLINIC | Age: 30
End: 2025-07-30
Payer: MEDICAID

## 2025-08-06 ENCOUNTER — LAB REQUISITION (OUTPATIENT)
Dept: LAB | Facility: HOSPITAL | Age: 30
End: 2025-08-06

## 2025-08-06 ENCOUNTER — OFFICE VISIT (OUTPATIENT)
Dept: OBSTETRICS AND GYNECOLOGY | Facility: CLINIC | Age: 30
End: 2025-08-06
Payer: COMMERCIAL

## 2025-08-06 VITALS
HEART RATE: 102 BPM | BODY MASS INDEX: 30.53 KG/M2 | SYSTOLIC BLOOD PRESSURE: 125 MMHG | WEIGHT: 136.19 LBS | DIASTOLIC BLOOD PRESSURE: 85 MMHG

## 2025-08-06 DIAGNOSIS — Z11.3 SCREEN FOR STD (SEXUALLY TRANSMITTED DISEASE): Primary | ICD-10-CM

## 2025-08-06 DIAGNOSIS — Z01.419 WELL WOMAN EXAM: ICD-10-CM

## 2025-08-06 DIAGNOSIS — Z32.00 POSSIBLE PREGNANCY: ICD-10-CM

## 2025-08-06 DIAGNOSIS — Z32.00 ENCOUNTER FOR PREGNANCY TEST, RESULT UNKNOWN: ICD-10-CM

## 2025-08-06 DIAGNOSIS — N89.8 VAGINAL IRRITATION: ICD-10-CM

## 2025-08-06 DIAGNOSIS — N92.6 IRREGULAR PERIODS/MENSTRUAL CYCLES: ICD-10-CM

## 2025-08-06 LAB
B-HCG SERPL-ACNC: <3 MIU/ML
PREGNANCY TEST URINE, POC: NEGATIVE

## 2025-08-06 PROCEDURE — 84702 CHORIONIC GONADOTROPIN TEST: CPT

## 2025-08-06 PROCEDURE — 99395 PREV VISIT EST AGE 18-39: CPT | Performed by: ADVANCED PRACTICE MIDWIFE

## 2025-08-06 PROCEDURE — 81025 URINE PREGNANCY TEST: CPT | Performed by: ADVANCED PRACTICE MIDWIFE

## 2025-08-06 PROCEDURE — 99213 OFFICE O/P EST LOW 20 MIN: CPT | Performed by: ADVANCED PRACTICE MIDWIFE

## 2025-08-06 PROCEDURE — 99214 OFFICE O/P EST MOD 30 MIN: CPT | Performed by: ADVANCED PRACTICE MIDWIFE

## 2025-08-06 ASSESSMENT — PAIN SCALES - GENERAL: PAINLEVEL_OUTOF10: 0-NO PAIN

## 2025-08-06 NOTE — PROGRESS NOTES
Subjective   Clinton Camp is a 30 y.o. female who is here for a routine well woman exam.     Concerns today:  STI screening  Irregular periods, considering a pregnancy    Patient's last menstrual period was 06/15/2025 (approximate).   Periods are irregular, skips 3-4  months at a time, lasting 10 days.   Dysmenorrhea:mild, occurring premenstrually.   Cyclic symptoms include breast tenderness and pelvic pain.     Sexual Activity: sexually active, male partners; Patient reports 1 partners in the last 12 months.   Pain with intercourse? No   Loss of desire? Yes, more recently, likely r/t life stressors  Able to have an orgasm? Yes   Concern for STI exposure?:  No     History of prior STI: gonorrhea, chlamydia, and trichomonas    Current contraception: none    Last pap:   History of abnormal Pap smear: no  Treatment for cervical dysplasia: no  Received HPV vaccine series?:  Yes ,     Family history of breast cancer or ovarian cancer: no    Menstrual History:  OB History          0    Para   0    Term   0       0    AB   0    Living   0         SAB   0    IAB   0    Ectopic   0    Multiple   0    Live Births   0                Menarche age: 14  Patient's last menstrual period was 06/15/2025 (approximate).         Objective   /85   Pulse 102   Wt 61.8 kg (136 lb 3 oz)   LMP 06/15/2025 (Approximate) Comment: irregular cycles  BMI 30.53 kg/m²     Physical Exam  Constitutional:       Appearance: Normal appearance.   HENT:      Head: Normocephalic.      Nose: Nose normal.      Mouth/Throat:      Mouth: Mucous membranes are moist.      Pharynx: Oropharynx is clear.     Eyes:      Conjunctiva/sclera: Conjunctivae normal.      Pupils: Pupils are equal, round, and reactive to light.       Cardiovascular:      Rate and Rhythm: Normal rate and regular rhythm.   Pulmonary:      Effort: Pulmonary effort is normal.      Breath sounds: Normal breath sounds.   Chest:   Breasts:     Right: Normal. No  mass, nipple discharge, skin change or tenderness.      Left: Normal. No mass, nipple discharge, skin change or tenderness.   Abdominal:      General: Abdomen is flat.      Palpations: Abdomen is soft.   Genitourinary:     General: Normal vulva.      Vagina: Vaginal discharge present.      Cervix: Normal.     Musculoskeletal:         General: Normal range of motion.      Cervical back: Normal range of motion and neck supple.     Skin:     General: Skin is warm and dry.     Neurological:      Mental Status: She is alert.     Psychiatric:         Mood and Affect: Mood normal.         Behavior: Behavior normal.          Assessment/Plan   Normal well woman exam  Continue healthy lifestyle habits  Consider taking a multivitamin daily  Continue recommended women's health screenings  Pap UTD, due 2026  Potential exposure to STIs  GC/CT, trich collected off urine  Accepts  serum collection for HIV, Hep B, Hep C, & Syphilis  Hx of BV  Would like retested, last treated with Flagyl last month  Possible Pregnancy  UPT & beta hCG today  Irregular periods  PCOS labs  Consider referral to [facility]      Return to care for annual exam or sooner as needed.    Myla Shaw, BRITNEY-CARLOS ALBERTO

## 2025-08-07 LAB
BV SCORE VAG QL: NORMAL
C TRACH RRNA SPEC QL NAA+PROBE: NOT DETECTED
N GONORRHOEA RRNA SPEC QL NAA+PROBE: NOT DETECTED
QUEST GC CT AMPLIFIED (ALWAYS MESSAGE): NORMAL
T VAGINALIS RRNA SPEC QL NAA+PROBE: NOT DETECTED

## 2025-08-10 LAB
17OHP SERPL-MCNC: NORMAL NG/DL
ALBUMIN SERPL-MCNC: 4.5 G/DL (ref 3.6–5.1)
ALP SERPL-CCNC: 97 U/L (ref 31–125)
ALT SERPL-CCNC: 20 U/L (ref 6–29)
ANION GAP SERPL CALCULATED.4IONS-SCNC: 6 MMOL/L (CALC) (ref 7–17)
AST SERPL-CCNC: 22 U/L (ref 10–30)
BILIRUB SERPL-MCNC: 0.3 MG/DL (ref 0.2–1.2)
BUN SERPL-MCNC: 10 MG/DL (ref 7–25)
CALCIUM SERPL-MCNC: 9.4 MG/DL (ref 8.6–10.2)
CHLORIDE SERPL-SCNC: 103 MMOL/L (ref 98–110)
CO2 SERPL-SCNC: 29 MMOL/L (ref 20–32)
CREAT SERPL-MCNC: 0.62 MG/DL (ref 0.5–0.96)
EGFRCR SERPLBLD CKD-EPI 2021: 124 ML/MIN/1.73M2
EST. AVERAGE GLUCOSE BLD GHB EST-MCNC: 111 MG/DL
EST. AVERAGE GLUCOSE BLD GHB EST-SCNC: 6.2 MMOL/L
FSH SERPL-ACNC: 8.5 MIU/ML
GLUCOSE SERPL-MCNC: 84 MG/DL (ref 65–139)
HBA1C MFR BLD: 5.5 %
HBV SURFACE AG SERPL QL IA: NORMAL
HBV SURFACE AG SERPL QL NT: NORMAL
HCV AB SERPL QL IA: NORMAL
HIV 1+2 AB+HIV1 P24 AG SERPL QL IA: NORMAL
HIV 1+2 AB+HIV1 P24 AG SERPL QL IA: NORMAL
LH SERPL-ACNC: 3.7 MIU/ML
POTASSIUM SERPL-SCNC: 4.5 MMOL/L (ref 3.5–5.3)
PROLACTIN SERPL-MCNC: 3.9 NG/ML
PROT SERPL-MCNC: 7.1 G/DL (ref 6.1–8.1)
SODIUM SERPL-SCNC: 138 MMOL/L (ref 135–146)
T PALLIDUM AB SER QL IA: NEGATIVE
T4 FREE SERPL-MCNC: 1.2 NG/DL (ref 0.8–1.8)
TESTOST FREE SERPL-MCNC: 1.4 PG/ML (ref 0.1–6.4)
TESTOST SERPL-MCNC: 11 NG/DL (ref 2–45)
TSH SERPL-ACNC: 1.07 MIU/L

## 2025-08-12 ENCOUNTER — PATIENT MESSAGE (OUTPATIENT)
Dept: DERMATOLOGY | Facility: CLINIC | Age: 30
End: 2025-08-12
Payer: COMMERCIAL

## 2025-08-12 LAB
HBV SURFACE AG SERPL QL IA: NORMAL
HCV AB SERPL QL IA: NORMAL
HIV 1+2 AB+HIV1 P24 AG SERPL QL IA: NORMAL
HIV 1+2 AB+HIV1 P24 AG SERPL QL IA: NORMAL
T PALLIDUM AB SER QL IA: NEGATIVE

## 2025-08-13 DIAGNOSIS — L71.0 PERIORAL DERMATITIS: ICD-10-CM

## 2025-08-14 LAB
17OHP SERPL-MCNC: 13 NG/DL
ALBUMIN SERPL-MCNC: 4.5 G/DL (ref 3.6–5.1)
ALP SERPL-CCNC: 97 U/L (ref 31–125)
ALT SERPL-CCNC: 20 U/L (ref 6–29)
ANION GAP SERPL CALCULATED.4IONS-SCNC: 6 MMOL/L (CALC) (ref 7–17)
AST SERPL-CCNC: 22 U/L (ref 10–30)
BILIRUB SERPL-MCNC: 0.3 MG/DL (ref 0.2–1.2)
BUN SERPL-MCNC: 10 MG/DL (ref 7–25)
CALCIUM SERPL-MCNC: 9.4 MG/DL (ref 8.6–10.2)
CHLORIDE SERPL-SCNC: 103 MMOL/L (ref 98–110)
CO2 SERPL-SCNC: 29 MMOL/L (ref 20–32)
CREAT SERPL-MCNC: 0.62 MG/DL (ref 0.5–0.96)
EGFRCR SERPLBLD CKD-EPI 2021: 124 ML/MIN/1.73M2
EST. AVERAGE GLUCOSE BLD GHB EST-MCNC: 111 MG/DL
EST. AVERAGE GLUCOSE BLD GHB EST-SCNC: 6.2 MMOL/L
FSH SERPL-ACNC: 8.5 MIU/ML
GLUCOSE SERPL-MCNC: 84 MG/DL (ref 65–139)
HBA1C MFR BLD: 5.5 %
LH SERPL-ACNC: 3.7 MIU/ML
POTASSIUM SERPL-SCNC: 4.5 MMOL/L (ref 3.5–5.3)
PROLACTIN SERPL-MCNC: 3.9 NG/ML
PROT SERPL-MCNC: 7.1 G/DL (ref 6.1–8.1)
SODIUM SERPL-SCNC: 138 MMOL/L (ref 135–146)
T4 FREE SERPL-MCNC: 1.2 NG/DL (ref 0.8–1.8)
TESTOST FREE SERPL-MCNC: 1.4 PG/ML (ref 0.1–6.4)
TESTOST SERPL-MCNC: 11 NG/DL (ref 2–45)
TSH SERPL-ACNC: 1.07 MIU/L

## 2025-08-15 ENCOUNTER — APPOINTMENT (OUTPATIENT)
Dept: OPHTHALMOLOGY | Facility: CLINIC | Age: 30
End: 2025-08-15
Payer: COMMERCIAL

## 2025-08-15 DIAGNOSIS — H35.352 CYSTOID MACULAR EDEMA OF LEFT EYE: ICD-10-CM

## 2025-08-15 DIAGNOSIS — Q15.0 JUVENILE GLAUCOMA: Primary | ICD-10-CM

## 2025-08-15 PROCEDURE — 99213 OFFICE O/P EST LOW 20 MIN: CPT | Performed by: OPHTHALMOLOGY

## 2025-08-15 PROCEDURE — 92134 CPTRZ OPH DX IMG PST SGM RTA: CPT | Performed by: OPHTHALMOLOGY

## 2025-08-15 PROCEDURE — RXMED WILLOW AMBULATORY MEDICATION CHARGE

## 2025-08-15 RX ORDER — CLINDAMYCIN PHOSPHATE 10 UG/ML
LOTION TOPICAL 2 TIMES DAILY
Qty: 60 ML | Refills: 5 | Status: SHIPPED | OUTPATIENT
Start: 2025-08-15

## 2025-08-15 RX ORDER — KETOROLAC TROMETHAMINE 4 MG/ML
1 SOLUTION/ DROPS OPHTHALMIC 4 TIMES DAILY
Qty: 60 ML | Refills: 1 | Status: SHIPPED | OUTPATIENT
Start: 2025-08-15 | End: 2025-11-13

## 2025-08-15 ASSESSMENT — REFRACTION_MANIFEST
OS_CYLINDER: -0.50
OS_AXIS: 065
OS_AXIS: 095
OS_SPHERE: +7.00
OD_CYLINDER: SPHERE
OS_SPHERE: +8.00
OS_CYLINDER: -0.75
METHOD_AUTOREFRACTION: 1
OD_SPHERE: PLANO

## 2025-08-15 ASSESSMENT — ENCOUNTER SYMPTOMS
HEMATOLOGIC/LYMPHATIC NEGATIVE: 0
NEUROLOGICAL NEGATIVE: 0
ALLERGIC/IMMUNOLOGIC NEGATIVE: 0
ENDOCRINE NEGATIVE: 0
CARDIOVASCULAR NEGATIVE: 0
RESPIRATORY NEGATIVE: 0
EYES NEGATIVE: 0
PSYCHIATRIC NEGATIVE: 0
CONSTITUTIONAL NEGATIVE: 0
GASTROINTESTINAL NEGATIVE: 0
MUSCULOSKELETAL NEGATIVE: 0

## 2025-08-15 ASSESSMENT — VISUAL ACUITY
METHOD: SNELLEN - LINEAR
OS_SC: CF@4FT
OS_PH_SC+: -1
OS_PH_SC: 20/200
OD_SC: LP

## 2025-08-15 ASSESSMENT — TONOMETRY
OS_IOP_MMHG: 12
IOP_METHOD: GOLDMANN APPLANATION
IOP_METHOD: GOLDMANN APPLANATION
OS_IOP_MMHG: 4
OD_IOP_MMHG: 13
OD_IOP_MMHG: 12

## 2025-08-15 ASSESSMENT — CONF VISUAL FIELD
OS_SUPERIOR_TEMPORAL_RESTRICTION: 3
OS_SUPERIOR_NASAL_RESTRICTION: 3
OD_SUPERIOR_NASAL_RESTRICTION: 1
OS_INFERIOR_TEMPORAL_RESTRICTION: 3
OD_SUPERIOR_TEMPORAL_RESTRICTION: 1
OS_INFERIOR_NASAL_RESTRICTION: 3
OD_INFERIOR_NASAL_RESTRICTION: 1
OD_INFERIOR_TEMPORAL_RESTRICTION: 1

## 2025-08-15 ASSESSMENT — PACHYMETRY
OS_CT(UM): 596
OD_CT(UM): 584

## 2025-08-15 ASSESSMENT — EXTERNAL EXAM - RIGHT EYE: OD_EXAM: NORMAL

## 2025-08-15 ASSESSMENT — CUP TO DISC RATIO
OS_RATIO: .8
OD_RATIO: .99

## 2025-08-15 ASSESSMENT — EXTERNAL EXAM - LEFT EYE: OS_EXAM: NORMAL

## 2025-08-15 ASSESSMENT — SLIT LAMP EXAM - LIDS
COMMENTS: GOOD POSITION
COMMENTS: GOOD POSITION

## 2025-08-16 PROCEDURE — RXMED WILLOW AMBULATORY MEDICATION CHARGE

## 2025-08-19 ENCOUNTER — APPOINTMENT (OUTPATIENT)
Dept: OPHTHALMOLOGY | Facility: CLINIC | Age: 30
End: 2025-08-19
Payer: COMMERCIAL

## 2025-08-21 ENCOUNTER — PHARMACY VISIT (OUTPATIENT)
Dept: PHARMACY | Facility: CLINIC | Age: 30
End: 2025-08-21
Payer: MEDICAID

## 2025-08-26 ENCOUNTER — APPOINTMENT (OUTPATIENT)
Dept: DERMATOLOGY | Facility: CLINIC | Age: 30
End: 2025-08-26
Payer: COMMERCIAL

## 2025-08-26 ENCOUNTER — OFFICE VISIT (OUTPATIENT)
Dept: OPHTHALMOLOGY | Facility: CLINIC | Age: 30
End: 2025-08-26
Payer: COMMERCIAL

## 2025-08-26 DIAGNOSIS — H35.352 CYSTOID MACULAR EDEMA OF LEFT EYE: Primary | ICD-10-CM

## 2025-08-26 DIAGNOSIS — H44.432 HYPOTONY OF EYE ASSOCIATED WITH OCULAR DISORDER, LEFT: ICD-10-CM

## 2025-08-26 PROCEDURE — RXMED WILLOW AMBULATORY MEDICATION CHARGE

## 2025-08-26 PROCEDURE — 99213 OFFICE O/P EST LOW 20 MIN: CPT

## 2025-08-26 PROCEDURE — 92134 CPTRZ OPH DX IMG PST SGM RTA: CPT

## 2025-08-26 ASSESSMENT — ENCOUNTER SYMPTOMS
EYES NEGATIVE: 0
ENDOCRINE NEGATIVE: 0
GASTROINTESTINAL NEGATIVE: 0
HEMATOLOGIC/LYMPHATIC NEGATIVE: 0
NEUROLOGICAL NEGATIVE: 0
CARDIOVASCULAR NEGATIVE: 0
RESPIRATORY NEGATIVE: 0
CONSTITUTIONAL NEGATIVE: 0
MUSCULOSKELETAL NEGATIVE: 0
PSYCHIATRIC NEGATIVE: 0
ALLERGIC/IMMUNOLOGIC NEGATIVE: 0

## 2025-08-26 ASSESSMENT — TONOMETRY
IOP_METHOD: GOLDMANN APPLANATION
OD_IOP_MMHG: 20
OS_IOP_MMHG: 6

## 2025-08-26 ASSESSMENT — CONF VISUAL FIELD
OS_INFERIOR_TEMPORAL_RESTRICTION: 3
OS_INFERIOR_NASAL_RESTRICTION: 3
OS_SUPERIOR_TEMPORAL_RESTRICTION: 3
OD_INFERIOR_NASAL_RESTRICTION: 1
METHOD: COUNTING FINGERS
OD_INFERIOR_TEMPORAL_RESTRICTION: 1
OD_SUPERIOR_TEMPORAL_RESTRICTION: 1
OD_SUPERIOR_NASAL_RESTRICTION: 1
OS_SUPERIOR_NASAL_RESTRICTION: 3

## 2025-08-26 ASSESSMENT — VISUAL ACUITY
OD_SC: LP
METHOD: SNELLEN - LINEAR
OS_SC: 20/400
OS_PH_SC: 20/200

## 2025-08-26 ASSESSMENT — CUP TO DISC RATIO
OS_RATIO: .8
OD_RATIO: .99

## 2025-08-26 ASSESSMENT — SLIT LAMP EXAM - LIDS
COMMENTS: GOOD POSITION
COMMENTS: GOOD POSITION

## 2025-08-26 ASSESSMENT — PACHYMETRY
OD_CT(UM): 584
OS_CT(UM): 596

## 2025-08-26 ASSESSMENT — EXTERNAL EXAM - LEFT EYE: OS_EXAM: NORMAL

## 2025-08-26 ASSESSMENT — EXTERNAL EXAM - RIGHT EYE: OD_EXAM: NORMAL

## 2025-08-27 ENCOUNTER — PHARMACY VISIT (OUTPATIENT)
Dept: PHARMACY | Facility: CLINIC | Age: 30
End: 2025-08-27
Payer: MEDICAID

## 2025-09-04 PROBLEM — H44.432: Status: ACTIVE | Noted: 2025-09-04

## 2025-09-04 PROBLEM — H35.352 CYSTOID MACULAR EDEMA OF LEFT EYE: Status: ACTIVE | Noted: 2025-09-04

## 2025-09-23 ENCOUNTER — APPOINTMENT (OUTPATIENT)
Dept: OPHTHALMOLOGY | Facility: CLINIC | Age: 30
End: 2025-09-23
Payer: COMMERCIAL

## 2025-09-26 ENCOUNTER — APPOINTMENT (OUTPATIENT)
Dept: OPHTHALMOLOGY | Facility: CLINIC | Age: 30
End: 2025-09-26
Payer: COMMERCIAL

## 2025-09-29 ENCOUNTER — APPOINTMENT (OUTPATIENT)
Dept: OPHTHALMOLOGY | Facility: CLINIC | Age: 30
End: 2025-09-29
Payer: COMMERCIAL

## 2026-01-06 ENCOUNTER — APPOINTMENT (OUTPATIENT)
Dept: DERMATOLOGY | Facility: CLINIC | Age: 31
End: 2026-01-06
Payer: COMMERCIAL

## (undated) DEVICE — COUNTER, NEEDLE, FOAM BLOCK, W/MAGNET, W/BLADE GUARD, 10 COUNT, RED, LF

## (undated) DEVICE — SUTURE, VICRYL, 8-0, 12 IN, TG1406, DA, VIOLET

## (undated) DEVICE — SYRINGE, 1 CC, LUER LOCK

## (undated) DEVICE — CANNULA, VISTEC, ANTERIOR CHAMBER, RYCROFT, ANGLED, 30 G X 7/8 IN

## (undated) DEVICE — SUTURE, VICRYL, 7-0, 18 IN, TG1608, DA, VIOLET

## (undated) DEVICE — CANNULA, ANTERIOR CHAMBER

## (undated) DEVICE — Device

## (undated) DEVICE — NEEDLE, HYPODERMIC, REGULAR WALL, REGULAR BEVEL, 30 G X 0.5 IN

## (undated) DEVICE — SPEAR, EYE, SURGICAL, WECK-CELL, CELLULOSE

## (undated) DEVICE — GLOVE, SURGICAL, PROTEXIS PI , 6.5, PF, LF

## (undated) DEVICE — ERASER, HEMOSTATIC, WET-FIELD, BIPOLAR, 18 G

## (undated) DEVICE — BLADE, BEAVER, MINI, 15, STAINLESS STEEL

## (undated) DEVICE — CORD, CAUTERY, BIPOLAR, STERILE

## (undated) DEVICE — KNIFE, OPHTHALMIC, CRESCENT, ANGLED, BEVEL UP, SATIN

## (undated) DEVICE — SOLUTION, BSS IRRIGATING 15ML